# Patient Record
Sex: FEMALE | Race: WHITE | NOT HISPANIC OR LATINO | Employment: OTHER
[De-identification: names, ages, dates, MRNs, and addresses within clinical notes are randomized per-mention and may not be internally consistent; named-entity substitution may affect disease eponyms.]

---

## 2018-01-05 ENCOUNTER — HOSPITAL (OUTPATIENT)
Dept: OTHER | Age: 68
End: 2018-01-05
Attending: EMERGENCY MEDICINE

## 2019-12-14 ENCOUNTER — HOSPITAL (OUTPATIENT)
Dept: OTHER | Age: 69
End: 2019-12-14
Attending: EMERGENCY MEDICINE

## 2021-06-26 ENCOUNTER — WALK IN (OUTPATIENT)
Dept: URGENT CARE | Age: 71
End: 2021-06-26
Attending: EMERGENCY MEDICINE

## 2021-06-26 ENCOUNTER — HOSPITAL ENCOUNTER (OUTPATIENT)
Dept: GENERAL RADIOLOGY | Age: 71
Discharge: HOME | End: 2021-06-26
Attending: EMERGENCY MEDICINE

## 2021-06-26 VITALS
WEIGHT: 160 LBS | DIASTOLIC BLOOD PRESSURE: 53 MMHG | RESPIRATION RATE: 16 BRPM | TEMPERATURE: 97 F | HEART RATE: 60 BPM | SYSTOLIC BLOOD PRESSURE: 126 MMHG | OXYGEN SATURATION: 98 %

## 2021-06-26 DIAGNOSIS — S80.01XA CONTUSION OF RIGHT KNEE, INITIAL ENCOUNTER: Primary | ICD-10-CM

## 2021-06-26 DIAGNOSIS — T14.90XA TRAUMA: ICD-10-CM

## 2021-06-26 DIAGNOSIS — S00.33XA CONTUSION OF NOSE, INITIAL ENCOUNTER: ICD-10-CM

## 2021-06-26 PROCEDURE — 99212 OFFICE O/P EST SF 10 MIN: CPT

## 2021-06-26 PROCEDURE — 73564 X-RAY EXAM KNEE 4 OR MORE: CPT

## 2021-06-26 PROCEDURE — 70160 X-RAY EXAM OF NASAL BONES: CPT

## 2021-06-26 RX ORDER — AMLODIPINE BESYLATE 5 MG/1
TABLET ORAL
COMMUNITY
Start: 2021-05-15

## 2021-06-26 RX ORDER — BENAZEPRIL HYDROCHLORIDE 10 MG/1
TABLET ORAL
COMMUNITY
Start: 2021-05-15

## 2021-06-26 ASSESSMENT — ENCOUNTER SYMPTOMS
FEVER: 0
ABDOMINAL PAIN: 0
EYE REDNESS: 0
BACK PAIN: 0
DIZZINESS: 0
COUGH: 0
ADENOPATHY: 0

## 2021-06-26 ASSESSMENT — PAIN SCALES - GENERAL
PAINLEVEL: 3
PAINLEVEL_OUTOF10: 3

## 2023-02-09 ENCOUNTER — MED REC SCAN ONLY (OUTPATIENT)
Dept: INTERNAL MEDICINE CLINIC | Facility: CLINIC | Age: 73
End: 2023-02-09

## 2023-02-09 ENCOUNTER — LAB ENCOUNTER (OUTPATIENT)
Dept: LAB | Age: 73
End: 2023-02-09
Attending: INTERNAL MEDICINE
Payer: COMMERCIAL

## 2023-02-09 ENCOUNTER — OFFICE VISIT (OUTPATIENT)
Dept: INTERNAL MEDICINE CLINIC | Facility: CLINIC | Age: 73
End: 2023-02-09

## 2023-02-09 VITALS
WEIGHT: 171 LBS | HEART RATE: 60 BPM | DIASTOLIC BLOOD PRESSURE: 80 MMHG | OXYGEN SATURATION: 95 % | SYSTOLIC BLOOD PRESSURE: 122 MMHG | HEIGHT: 61.1 IN | RESPIRATION RATE: 18 BRPM | BODY MASS INDEX: 32.28 KG/M2

## 2023-02-09 DIAGNOSIS — H00.019 HORDEOLUM EXTERNUM, UNSPECIFIED LATERALITY: ICD-10-CM

## 2023-02-09 DIAGNOSIS — I10 PRIMARY HYPERTENSION: Primary | ICD-10-CM

## 2023-02-09 DIAGNOSIS — I10 PRIMARY HYPERTENSION: ICD-10-CM

## 2023-02-09 DIAGNOSIS — M21.611 BILATERAL BUNIONS: ICD-10-CM

## 2023-02-09 DIAGNOSIS — M20.41 HAMMERTOES OF BOTH FEET: ICD-10-CM

## 2023-02-09 DIAGNOSIS — M20.42 HAMMERTOES OF BOTH FEET: ICD-10-CM

## 2023-02-09 DIAGNOSIS — M21.612 BILATERAL BUNIONS: ICD-10-CM

## 2023-02-09 LAB
ALBUMIN SERPL-MCNC: 3.9 G/DL (ref 3.4–5)
ALBUMIN/GLOB SERPL: 1 {RATIO} (ref 1–2)
ALP LIVER SERPL-CCNC: 74 U/L
ALT SERPL-CCNC: 45 U/L
ANION GAP SERPL CALC-SCNC: 8 MMOL/L (ref 0–18)
AST SERPL-CCNC: 31 U/L (ref 15–37)
BILIRUB SERPL-MCNC: 0.5 MG/DL (ref 0.1–2)
BUN BLD-MCNC: 10 MG/DL (ref 7–18)
BUN/CREAT SERPL: 18.9 (ref 10–20)
CALCIUM BLD-MCNC: 10.5 MG/DL (ref 8.5–10.1)
CHLORIDE SERPL-SCNC: 103 MMOL/L (ref 98–112)
CHOLEST SERPL-MCNC: 199 MG/DL (ref ?–200)
CO2 SERPL-SCNC: 28 MMOL/L (ref 21–32)
CREAT BLD-MCNC: 0.53 MG/DL
DEPRECATED RDW RBC AUTO: 45.6 FL (ref 35.1–46.3)
ERYTHROCYTE [DISTWIDTH] IN BLOOD BY AUTOMATED COUNT: 13.3 % (ref 11–15)
FASTING PATIENT LIPID ANSWER: YES
FASTING STATUS PATIENT QL REPORTED: YES
GFR SERPLBLD BASED ON 1.73 SQ M-ARVRAT: 99 ML/MIN/1.73M2 (ref 60–?)
GLOBULIN PLAS-MCNC: 3.8 G/DL (ref 2.8–4.4)
GLUCOSE BLD-MCNC: 97 MG/DL (ref 70–99)
HCT VFR BLD AUTO: 44.2 %
HDLC SERPL-MCNC: 67 MG/DL (ref 40–59)
HGB BLD-MCNC: 14.7 G/DL
LDLC SERPL CALC-MCNC: 121 MG/DL (ref ?–100)
MCH RBC QN AUTO: 30.6 PG (ref 26–34)
MCHC RBC AUTO-ENTMCNC: 33.3 G/DL (ref 31–37)
MCV RBC AUTO: 92.1 FL
NONHDLC SERPL-MCNC: 132 MG/DL (ref ?–130)
OSMOLALITY SERPL CALC.SUM OF ELEC: 287 MOSM/KG (ref 275–295)
PLATELET # BLD AUTO: 287 10(3)UL (ref 150–450)
POTASSIUM SERPL-SCNC: 4 MMOL/L (ref 3.5–5.1)
PROT SERPL-MCNC: 7.7 G/DL (ref 6.4–8.2)
RBC # BLD AUTO: 4.8 X10(6)UL
SODIUM SERPL-SCNC: 139 MMOL/L (ref 136–145)
TRIGL SERPL-MCNC: 58 MG/DL (ref 30–149)
TSI SER-ACNC: 1.17 MIU/ML (ref 0.36–3.74)
VLDLC SERPL CALC-MCNC: 10 MG/DL (ref 0–30)
WBC # BLD AUTO: 5.8 X10(3) UL (ref 4–11)

## 2023-02-09 PROCEDURE — 80061 LIPID PANEL: CPT

## 2023-02-09 PROCEDURE — 85027 COMPLETE CBC AUTOMATED: CPT

## 2023-02-09 PROCEDURE — 99204 OFFICE O/P NEW MOD 45 MIN: CPT | Performed by: INTERNAL MEDICINE

## 2023-02-09 PROCEDURE — 84443 ASSAY THYROID STIM HORMONE: CPT

## 2023-02-09 PROCEDURE — 80053 COMPREHEN METABOLIC PANEL: CPT

## 2023-02-09 PROCEDURE — 36415 COLL VENOUS BLD VENIPUNCTURE: CPT

## 2023-02-09 RX ORDER — AMLODIPINE BESYLATE 5 MG/1
TABLET ORAL
COMMUNITY
Start: 2022-11-14 | End: 2023-02-09

## 2023-02-09 RX ORDER — AMLODIPINE BESYLATE 5 MG/1
5 TABLET ORAL DAILY
Qty: 90 TABLET | Refills: 1 | Status: SHIPPED | OUTPATIENT
Start: 2023-02-09

## 2023-02-09 RX ORDER — BENAZEPRIL HYDROCHLORIDE 10 MG/1
10 TABLET ORAL DAILY
Qty: 90 TABLET | Refills: 1 | Status: SHIPPED | OUTPATIENT
Start: 2023-02-09

## 2023-02-09 RX ORDER — BENAZEPRIL HYDROCHLORIDE 10 MG/1
TABLET ORAL
COMMUNITY
Start: 2022-11-14 | End: 2023-02-09

## 2023-02-09 RX ORDER — SULFACETAMIDE SODIUM 100 MG/ML
1 SOLUTION/ DROPS OPHTHALMIC
Qty: 1 EACH | Refills: 0 | Status: SHIPPED | OUTPATIENT
Start: 2023-02-09

## 2023-02-09 RX ORDER — SULFACETAMIDE SODIUM 100 MG/ML
1 SOLUTION/ DROPS OPHTHALMIC
COMMUNITY
End: 2023-02-09

## 2023-02-11 DIAGNOSIS — E83.52 HYPERCALCEMIA: Primary | ICD-10-CM

## 2023-02-22 ENCOUNTER — PATIENT MESSAGE (OUTPATIENT)
Dept: INTERNAL MEDICINE CLINIC | Facility: CLINIC | Age: 73
End: 2023-02-22

## 2023-02-22 NOTE — TELEPHONE ENCOUNTER
Katey Baca RN 2/22/2023 3:10 PM CST        ----- Message -----  From: Midkiff Kehernan  Sent: 2/22/2023 1:43 PM CST  To: Em Rn Triage  Subject: labs     Dr Wyatt Blair,   I just reviewed my labs and have a question? I realized I am taking Calcium with vitamin D 2 times a day contains D3 20mcg 800IU, a multivitamin contains D3 25mcg 1,000IU and also D3 25mcg 1000IU. I was wondering if I should stop taking these before I have the additional lab work you ordered. If so how long beforehand.    Thank you Isabel Hudson

## 2023-02-23 NOTE — TELEPHONE ENCOUNTER
As long as she is being consistent in taking this daily then it should be okay to continue   and ok to get blood work done

## 2023-02-25 ENCOUNTER — LAB ENCOUNTER (OUTPATIENT)
Dept: LAB | Age: 73
End: 2023-02-25
Attending: INTERNAL MEDICINE
Payer: MEDICARE

## 2023-02-25 DIAGNOSIS — E83.52 HYPERCALCEMIA: ICD-10-CM

## 2023-02-25 LAB
ANION GAP SERPL CALC-SCNC: 7 MMOL/L (ref 0–18)
BUN BLD-MCNC: 14 MG/DL (ref 7–18)
CALCIUM BLD-MCNC: 10.1 MG/DL (ref 8.5–10.1)
CALCIUM BLD-MCNC: 9.8 MG/DL (ref 8.5–10.1)
CHLORIDE SERPL-SCNC: 103 MMOL/L (ref 98–112)
CO2 SERPL-SCNC: 26 MMOL/L (ref 21–32)
CREAT BLD-MCNC: 0.47 MG/DL
CREAT BLD-MCNC: 0.5 MG/DL
FASTING STATUS PATIENT QL REPORTED: YES
GFR SERPLBLD BASED ON 1.73 SQ M-ARVRAT: 100 ML/MIN/1.73M2 (ref 60–?)
GLUCOSE BLD-MCNC: 83 MG/DL (ref 70–99)
OSMOLALITY SERPL CALC.SUM OF ELEC: 282 MOSM/KG (ref 275–295)
PHOSPHATE SERPL-MCNC: 3.3 MG/DL (ref 2.5–4.9)
PHOSPHATE SERPL-MCNC: 3.6 MG/DL (ref 2.5–4.9)
POTASSIUM SERPL-SCNC: 4.2 MMOL/L (ref 3.5–5.1)
PTH-INTACT SERPL-MCNC: 50.3 PG/ML (ref 18.5–88)
SODIUM SERPL-SCNC: 136 MMOL/L (ref 136–145)
VIT D+METAB SERPL-MCNC: 37.8 NG/ML (ref 30–100)

## 2023-02-25 PROCEDURE — 82310 ASSAY OF CALCIUM: CPT

## 2023-02-25 PROCEDURE — 84100 ASSAY OF PHOSPHORUS: CPT

## 2023-02-25 PROCEDURE — 36415 COLL VENOUS BLD VENIPUNCTURE: CPT

## 2023-02-25 PROCEDURE — 82306 VITAMIN D 25 HYDROXY: CPT

## 2023-02-25 PROCEDURE — 83970 ASSAY OF PARATHORMONE: CPT

## 2023-02-25 PROCEDURE — 80048 BASIC METABOLIC PNL TOTAL CA: CPT

## 2023-02-25 PROCEDURE — 82565 ASSAY OF CREATININE: CPT

## 2023-03-13 ENCOUNTER — OFFICE VISIT (OUTPATIENT)
Dept: INTERNAL MEDICINE CLINIC | Facility: CLINIC | Age: 73
End: 2023-03-13

## 2023-03-13 VITALS
WEIGHT: 170.38 LBS | OXYGEN SATURATION: 98 % | HEIGHT: 61.1 IN | HEART RATE: 56 BPM | SYSTOLIC BLOOD PRESSURE: 116 MMHG | RESPIRATION RATE: 16 BRPM | BODY MASS INDEX: 32.17 KG/M2 | DIASTOLIC BLOOD PRESSURE: 82 MMHG

## 2023-03-13 DIAGNOSIS — I10 PRIMARY HYPERTENSION: ICD-10-CM

## 2023-03-13 DIAGNOSIS — Z00.00 ENCOUNTER FOR ANNUAL HEALTH EXAMINATION: Primary | ICD-10-CM

## 2023-03-13 DIAGNOSIS — Z12.83 SKIN CANCER SCREENING: ICD-10-CM

## 2023-03-13 RX ORDER — AMLODIPINE BESYLATE 5 MG/1
5 TABLET ORAL DAILY
Qty: 90 TABLET | Refills: 3 | Status: SHIPPED | OUTPATIENT
Start: 2023-03-13

## 2023-03-13 RX ORDER — BENAZEPRIL HYDROCHLORIDE 10 MG/1
10 TABLET ORAL DAILY
Qty: 90 TABLET | Refills: 3 | Status: SHIPPED | OUTPATIENT
Start: 2023-03-13

## 2023-07-17 ENCOUNTER — WALK IN (OUTPATIENT)
Dept: URGENT CARE | Age: 73
End: 2023-07-17
Attending: EMERGENCY MEDICINE

## 2023-07-17 VITALS
WEIGHT: 165 LBS | SYSTOLIC BLOOD PRESSURE: 144 MMHG | TEMPERATURE: 98.3 F | DIASTOLIC BLOOD PRESSURE: 77 MMHG | HEART RATE: 57 BPM | RESPIRATION RATE: 16 BRPM | OXYGEN SATURATION: 97 %

## 2023-07-17 DIAGNOSIS — H00.011 HORDEOLUM EXTERNUM OF RIGHT UPPER EYELID: Primary | ICD-10-CM

## 2023-07-17 PROCEDURE — 99212 OFFICE O/P EST SF 10 MIN: CPT

## 2023-07-17 RX ORDER — ERYTHROMYCIN 5 MG/G
0.5 OINTMENT OPHTHALMIC 4 TIMES DAILY
Qty: 3.5 G | Refills: 0 | Status: SHIPPED | OUTPATIENT
Start: 2023-07-17 | End: 2023-07-24

## 2023-07-17 RX ORDER — AMOXICILLIN AND CLAVULANATE POTASSIUM 875; 125 MG/1; MG/1
1 TABLET, FILM COATED ORAL 2 TIMES DAILY
Qty: 10 TABLET | Refills: 0 | Status: SHIPPED | OUTPATIENT
Start: 2023-07-17 | End: 2023-07-22

## 2023-07-17 ASSESSMENT — ENCOUNTER SYMPTOMS
VOMITING: 0
SHORTNESS OF BREATH: 0
BRUISES/BLEEDS EASILY: 0
BACK PAIN: 0
SORE THROAT: 0
ABDOMINAL PAIN: 0
DIZZINESS: 0
FEVER: 0
DIARRHEA: 0
COUGH: 0
HEADACHES: 0
CHILLS: 0

## 2023-07-17 ASSESSMENT — PAIN SCALES - GENERAL
PAINLEVEL: 0
PAINLEVEL_OUTOF10: 0

## 2023-08-02 DIAGNOSIS — I10 PRIMARY HYPERTENSION: ICD-10-CM

## 2023-08-03 RX ORDER — AMLODIPINE BESYLATE 5 MG/1
5 TABLET ORAL DAILY
Qty: 90 TABLET | Refills: 3 | OUTPATIENT
Start: 2023-08-03

## 2023-08-03 RX ORDER — BENAZEPRIL HYDROCHLORIDE 10 MG/1
10 TABLET ORAL DAILY
Qty: 90 TABLET | Refills: 3 | OUTPATIENT
Start: 2023-08-03

## 2024-02-06 ENCOUNTER — HOSPITAL ENCOUNTER (OUTPATIENT)
Dept: MAMMOGRAPHY | Age: 74
Discharge: HOME | End: 2024-02-06
Attending: INTERNAL MEDICINE
Payer: MEDICARE

## 2024-02-06 DIAGNOSIS — Z12.31 ENCOUNTER FOR SCREENING MAMMOGRAM FOR MALIGNANT NEOPLASM OF BREAST: ICD-10-CM

## 2024-02-06 PROCEDURE — 77067 SCR MAMMO BI INCL CAD: CPT | Performed by: INTERNAL MEDICINE

## 2024-02-06 PROCEDURE — 77063 BREAST TOMOSYNTHESIS BI: CPT | Performed by: INTERNAL MEDICINE

## 2024-03-22 ENCOUNTER — HOSPITAL ENCOUNTER (OUTPATIENT)
Dept: MAMMOGRAPHY | Facility: HOSPITAL | Age: 74
Discharge: HOME | End: 2024-03-22
Attending: INTERNAL MEDICINE
Payer: MEDICARE

## 2024-03-22 DIAGNOSIS — R92.2 INCONCLUSIVE MAMMOGRAM: ICD-10-CM

## 2024-03-22 PROCEDURE — 76642 ULTRASOUND BREAST LIMITED: CPT | Performed by: INTERNAL MEDICINE

## 2024-03-22 PROCEDURE — 77061 BREAST TOMOSYNTHESIS UNI: CPT | Performed by: INTERNAL MEDICINE

## 2024-03-22 PROCEDURE — 77065 DX MAMMO INCL CAD UNI: CPT | Performed by: INTERNAL MEDICINE

## 2024-04-02 ENCOUNTER — OFFICE VISIT (OUTPATIENT)
Dept: INTERNAL MEDICINE CLINIC | Facility: CLINIC | Age: 74
End: 2024-04-02
Payer: MEDICARE

## 2024-04-02 VITALS
HEIGHT: 61.1 IN | BODY MASS INDEX: 34.4 KG/M2 | WEIGHT: 182.19 LBS | HEART RATE: 77 BPM | RESPIRATION RATE: 18 BRPM | SYSTOLIC BLOOD PRESSURE: 138 MMHG | DIASTOLIC BLOOD PRESSURE: 76 MMHG

## 2024-04-02 DIAGNOSIS — N39.46 MIXED STRESS AND URGE URINARY INCONTINENCE: ICD-10-CM

## 2024-04-02 DIAGNOSIS — Z91.89 AT RISK FOR DECREASED BONE DENSITY: ICD-10-CM

## 2024-04-02 DIAGNOSIS — Z78.0 ASYMPTOMATIC MENOPAUSAL STATE: ICD-10-CM

## 2024-04-02 DIAGNOSIS — Z00.00 ENCOUNTER FOR ANNUAL HEALTH EXAMINATION: Primary | ICD-10-CM

## 2024-04-02 DIAGNOSIS — M20.42 HAMMER TOE OF LEFT FOOT: ICD-10-CM

## 2024-04-02 DIAGNOSIS — E55.9 VITAMIN D DEFICIENCY: ICD-10-CM

## 2024-04-02 DIAGNOSIS — H00.019 HORDEOLUM EXTERNUM, UNSPECIFIED LATERALITY: ICD-10-CM

## 2024-04-02 DIAGNOSIS — I10 PRIMARY HYPERTENSION: ICD-10-CM

## 2024-04-02 DIAGNOSIS — Z11.59 NEED FOR HEPATITIS C SCREENING TEST: ICD-10-CM

## 2024-04-02 PROCEDURE — G0439 PPPS, SUBSEQ VISIT: HCPCS | Performed by: INTERNAL MEDICINE

## 2024-04-02 RX ORDER — AMLODIPINE BESYLATE 5 MG/1
5 TABLET ORAL DAILY
Qty: 90 TABLET | Refills: 3 | Status: SHIPPED | OUTPATIENT
Start: 2024-04-02

## 2024-04-02 RX ORDER — SULFACETAMIDE SODIUM 100 MG/ML
1 SOLUTION/ DROPS OPHTHALMIC
Qty: 1 EACH | Refills: 0 | Status: CANCELLED | OUTPATIENT
Start: 2024-04-02

## 2024-04-02 RX ORDER — BENAZEPRIL HYDROCHLORIDE 10 MG/1
10 TABLET ORAL DAILY
Qty: 90 TABLET | Refills: 3 | Status: SHIPPED | OUTPATIENT
Start: 2024-04-02

## 2024-04-02 NOTE — PROGRESS NOTES
Subjective:   Sudha Patrick is a 73 year old female who presents for a Medicare Subsequent Annual Wellness visit (Pt already had Initial Annual Wellness) and scheduled follow up of multiple significant but stable problems.   PT COMES FOR HER MEDICARE PHYSICAL   OVERALL DOING WELL             HISTORY  New pt   C/c establish care   C/o seen last her pcp 2/11/2022     Retired in 2017 and moved here then   Used to be seen in [facility] in [place]      Retired nurse -med surg , lives alone      PMH  HTN  Styes   Hammertoes and bunions   --------------------------------------------------------------------------------------------------          History/Other:   Fall Risk Assessment:   She has been screened for Falls and is low risk.      Cognitive Assessment:   She had a completely normal cognitive assessment - see flowsheet entries       Functional Ability/Status:   Sudha Patrick has a completely normal functional assessment. See flowsheet for details.        Depression Screening (PHQ-2/PHQ-9): PHQ-2 SCORE: 0  , done 4/2/2024   If you checked off any problems, how difficult have these problems made it for you to do your work, take care of things at home, or get along with other people?: Not difficult at all    Last [facility] Suicide Screening on 4/2/2024 was No Risk.          Advanced Directives:   She does have a Living Will but we do NOT have it on file in Epic.    She does have a POA but we do NOT have it on file in Epic.    Discussed Advance Care Planning with patient (and family/surrogate if present). Standard forms made available to patient in After Visit Summary.      Patient Active Problem List   Diagnosis    Primary hypertension     Allergies:  She has No Known Allergies.    Current Medications:  Outpatient Medications Marked as Taking for the 4/2/24 encounter (Office Visit) with Princess Gregorio MD   Medication Sig    amLODIPine 5 MG Oral Tab Take 1 tablet (5 mg total) by mouth daily.     Benazepril HCl 10 MG Oral Tab Take 1 tablet (10 mg total) by mouth daily.    sulfacetamide 10 % Ophthalmic Solution Place 1 drop into both eyes every 3 (three) hours.       Medical History:  She  has a past medical history of Allergic rhinitis, Essential hypertension, and Obesity.  Surgical History:  She  has a past surgical history that includes other surgical history; needle biopsy left (); ; and tubal ligation.   Family History:  Her family history includes Cancer in her sister; Depression in her sister; Diabetes in her father; Heart Disorder in her father; Hypertension in her father and mother; Lipids in her father.  Social History:  She  reports that she has never smoked. She has never used smokeless tobacco. She reports that she does not currently use alcohol. She reports that she does not use drugs.    Tobacco:  She has never smoked tobacco.    CAGE Alcohol Screen:   CAGE screening score of 0 on 2024, showing low risk of alcohol abuse.      Patient Care Team:  Princess Gregorio MD as PCP - General (Internal Medicine)    Review of Systems  GENERAL: feels well otherwise  SKIN: denies any unusual skin lesions  EYES: denies blurred vision or double vision  HEENT: denies nasal congestion, sinus pain or ST  LUNGS: denies shortness of breath with exertion  CARDIOVASCULAR: denies chest pain on exertion  GI: denies abdominal pain, denies heartburn  : denies dysuria, vaginal discharge or itching, +  complaint of urinary incontinence   MUSCULOSKELETAL: denies back pain  NEURO: denies headaches  PSYCHE: denies depression or anxiety  HEMATOLOGIC: denies hx of anemia  ENDOCRINE: denies thyroid history  ALL/ASTHMA: denies hx of allergy or asthma    Objective:   Physical Exam  GENERAL: well developed, well nourished,in no apparent distress  SKIN: no rashes,no suspicious lesions  HEENT: atraumatic, normocephalic,ears and throat are clear, no frontal or maxillary sinus tenderness, pupils equal reactive to light  bilaterally, extra ocular muscles intact  NECK: supple,no adenopathy,nontender   LUNGS: clear to auscultation, no wheeze  CARDIO: RRR without murmur  GI: good BS's,no masses or tenderness  EXTREMITIES: no cyanosis, or edema  Declined breast exam       /76 (BP Location: Right arm, Patient Position: Sitting, Cuff Size: adult)   Pulse 77   Resp 18   Ht 5' 1.1\" (1.552 m)   Wt 182 lb 3.2 oz (82.6 kg)   LMP  (LMP Unknown)   BMI 34.31 kg/m²  Estimated body mass index is 34.31 kg/m² as calculated from the following:    Height as of this encounter: 5' 1.1\" (1.552 m).    Weight as of this encounter: 182 lb 3.2 oz (82.6 kg).    Medicare Hearing Assessment:   Hearing Screening    Time taken: 4/2/2024 11:40 AM  Screening Method: Questionnaire  I have a problem hearing over the telephone: No I have trouble following the conversations when two or more people are talking at the same time: No   I have trouble understanding things on the TV: No I have to strain to understand conversations: No   I have to worry about missing the telephone ring or doorbell: No I have trouble hearing conversations in a noisy background such as a crowded room or restaurant: No   I get confused about where sounds come from: No I misunderstand some words in a sentence and need to ask people to repeat themselves: No   I especially have trouble understanding the speech of women and children: No I have trouble understanding the speaker in a large room such as at a meeting or place of [place]: No   Many people I talk to seem to mumble (or don't speak clearly): No People get annoyed because I misunderstand what they say: No   I misunderstand what others are saying and make inappropriate responses: No I avoid social activities because I cannot hear well and fear I will reply improperly: No   Family members and friends have told me they think I may have hearing loss: No                   Assessment & Plan:   Sudha Patrick is a 73 year old female  who presents for a Medicare Assessment.     1. Encounter for annual health examination (Primary)  -     Comp Metabolic Panel (14); Future; Expected date: 04/02/2024  -     Lipid Panel; Future; Expected date: 04/02/2024  -     Assay, Thyroid Stim Hormone; Future; Expected date: 04/02/2024  -     Vitamin D; Future; Expected date: 04/02/2024  -     CBC, Platelet; No Differential; Future; Expected date: 04/02/2024    2. Primary hypertension  -     amLODIPine Besylate; Take 1 tablet (5 mg total) by mouth daily.  Dispense: 90 tablet; Refill: 3  -     Benazepril HCl; Take 1 tablet (10 mg total) by mouth daily.  Dispense: 90 tablet; Refill: 3  -     Comp Metabolic Panel (14); Future; Expected date: 04/02/2024  -     Lipid Panel; Future; Expected date: 04/02/2024  -     Assay, Thyroid Stim Hormone; Future; Expected date: 04/02/2024  -     Vitamin D; Future; Expected date: 04/02/2024  -     CBC, Platelet; No Differential; Future; Expected date: 04/02/2024  Advised pt to follow a low salt , low sodium (including fast foods and processed foods), can look up DASH diet, exercise 30 min a day , monitor bp   Cont meds   3. Hordeolum externum, unspecified laterality    4. Need for hepatitis C screening test  -     HCV Antibody; Future; Expected date: 04/02/2024 stable  Ordered  5. Mixed stress and urge urinary incontinence  -     PHYSICAL THERAPY - INTERNAL  Will send for physical therapy for pelvic floor therapy  6. Vitamin D deficiency  -     Vitamin D; Future; Expected date: 04/02/2024  Recheck   7. At risk for decreased bone density  -     XR DEXA BONE DENSITOMETRY (CPT=77080); Future; Expected date: 04/02/2024  Bone density scan   8. Asymptomatic menopausal state  -     XR DEXA BONE DENSITOMETRY (CPT=77080); Future; Expected date: 04/02/2024  bone density scan ordered  9. Hammer toe of left foot  -     Podiatry Referral - In Network  Will refer             preventative medicine   Labs -2/2023   Pap- 2018 , defer further    Mammogram - 3/2024  dexa 3/11/2021,reordered   cscope - 3/12/2021 rpt 5 yrs IE 2026      The patient indicates understanding of these issues and agrees to the plan.  Reinforced healthy diet, lifestyle, and exercise.      Return in 1 year (on 4/2/2025).     Princess Gregorio MD, 4/2/2024     Supplementary Documentation:   General Health:  In the past six months, have you lost more than 10 pounds without trying?: 2 - No  Has your appetite been poor?: No  Type of Diet: Balanced  How does the patient maintain a good energy level?: Daily Walks;Stretching  How would you describe your daily physical activity?: Light  How would you describe your current health state?: Good  How do you maintain positive mental well-being?: Games;Visiting Friends;Visiting Family  On a scale of 0 to 10, with 0 being no pain and 10 being severe pain, what is your pain level?: 0 - (None)  At any time do you feel concerned for the safety/well-being of yourself and/or your children, in your home or elsewhere?: No  Have you had any immunizations at another office such as Influenza, Hepatitis B, Tetanus, or Pneumococcal?: Yes         Sudha Patrick's SCREENING SCHEDULE   Tests on this list are recommended by your physician but may not be covered, or covered at this frequency, by your insurer.   Please check with your insurance carrier before scheduling to verify coverage.   PREVENTATIVE SERVICES FREQUENCY &  COVERAGE DETAILS LAST COMPLETION DATE   Diabetes Screening    Fasting Blood Sugar /  Glucose    One screening every 12 months if never tested or if previously tested but not diagnosed with pre-diabetes   One screening every 6 months if diagnosed with pre-diabetes Lab Results   Component Value Date    GLU 83 02/25/2023        Cardiovascular Disease Screening    Lipid Panel  Cholesterol  Lipoprotein (HDL)  Triglycerides Covered every 5 years for all Medicare beneficiaries without apparent signs or symptoms of cardiovascular disease Lab Results    Component Value Date    CHOLEST 199 02/09/2023    HDL 67 (H) 02/09/2023     (H) 02/09/2023    TRIG 58 02/09/2023         Electrocardiogram (EKG)   Covered if needed at Welcome to Medicare, and non-screening if indicated for medical reasons -      Ultrasound Screening for Abdominal Aortic Aneurysm (AAA) Covered once in a lifetime for one of the following risk factors    Men who are 65-75 years old and have ever smoked    Anyone with a family history -     Colorectal Cancer Screening  Covered for ages 50-85; only need ONE of the following:    Colonoscopy   Covered every 10 years    Covered every 2 years if patient is at high risk or previous colonoscopy was abnormal 03/12/2021    Health Maintenance   Topic Date Due    Colorectal Cancer Screening  03/12/2026       Flexible Sigmoidoscopy   Covered every 4 years -    Fecal Occult Blood Test Covered annually -   Bone Density Screening    Bone density screening    Covered every 2 years after age 65 if diagnosed with risk of osteoporosis or estrogen deficiency.    Covered yearly for long-term glucocorticoid medication use (Steroids) No results found for this or any previous visit.      No recommendations at this time   Pap and Pelvic    Pap   Covered every 2 years for women at normal risk; Annually if at high risk -  No recommendations at this time    Chlamydia Annually if high risk -  No recommendations at this time   Screening Mammogram    Mammogram     Recommend annually for all female patients aged 40 and older    One baseline mammogram covered for patients aged 35-39 03/22/2024    Health Maintenance   Topic Date Due    Mammogram  03/22/2025       Immunizations    Influenza Covered once per flu season  Please get every year 10/16/2023  No recommendations at this time    Pneumococcal Each vaccine ([id number] & [id number]) covered once after 65 Prevnar 13: 05/22/2017    [id number]: 02/11/2022     No recommendations at this time    Hepatitis B One screening  covered for patients with certain risk factors   -  No recommendations at this time    Tetanus Toxoid Not covered by Medicare Part B unless medically necessary (cut with metal); may be covered with your pharmacy prescription benefits -    Tetanus, Diptheria and Pertusis TD and TDaP Not covered by Medicare Part B -  No recommendations at this time    Zoster Not covered by Medicare Part B; may be covered with your pharmacy  prescription benefits -  No recommendations at this time     Annual Monitoring of Persistent Medications (ACE/ARB, digoxin diuretics, anticonvulsants)    Potassium Annually Lab Results   Component Value Date    K 4.2 02/25/2023         Creatinine   Annually Lab Results   Component Value Date    CREATSERUM 0.47 (L) 02/25/2023    CREATSERUM 0.50 (L) 02/25/2023         BUN Annually Lab Results   Component Value Date    BUN 14 02/25/2023       Drug Serum Conc Annually No results found for: \"DIGOXIN\", \"DIG\", \"VALP\"

## 2024-04-02 NOTE — PATIENT INSTRUCTIONS
Sudha Patrick's SCREENING SCHEDULE   Tests on this list are recommended by your physician but may not be covered, or covered at this frequency, by your insurer.   Please check with your insurance carrier before scheduling to verify coverage.   PREVENTATIVE SERVICES FREQUENCY &  COVERAGE DETAILS LAST COMPLETION DATE   Diabetes Screening    Fasting Blood Sugar /  Glucose    One screening every 12 months if never tested or if previously tested but not diagnosed with pre-diabetes   One screening every 6 months if diagnosed with pre-diabetes Lab Results   Component Value Date    GLU 83 02/25/2023        Cardiovascular Disease Screening    Lipid Panel  Cholesterol  Lipoprotein (HDL)  Triglycerides Covered every 5 years for all Medicare beneficiaries without apparent signs or symptoms of cardiovascular disease Lab Results   Component Value Date    CHOLEST 199 02/09/2023    HDL 67 (H) 02/09/2023     (H) 02/09/2023    TRIG 58 02/09/2023         Electrocardiogram (EKG)   Covered if needed at Welcome to Medicare, and non-screening if indicated for medical reasons -      Ultrasound Screening for Abdominal Aortic Aneurysm (AAA) Covered once in a lifetime for one of the following risk factors   • Men who are 65-75 years old and have ever smoked   • Anyone with a family history -     Colorectal Cancer Screening  Covered for ages 50-85; only need ONE of the following:    Colonoscopy   Covered every 10 years    Covered every 2 years if patient is at high risk or previous colonoscopy was abnormal 03/12/2021    Health Maintenance   Topic Date Due   • Colorectal Cancer Screening  03/12/2026       Flexible Sigmoidoscopy   Covered every 4 years -    Fecal Occult Blood Test Covered annually -   Bone Density Screening    Bone density screening    Covered every 2 years after age 65 if diagnosed with risk of osteoporosis or estrogen deficiency.    Covered yearly for long-term glucocorticoid medication use (Steroids) No results  found for this or any previous visit.      No recommendations at this time   Pap and Pelvic    Pap   Covered every 2 years for women at normal risk; Annually if at high risk -  No recommendations at this time    Chlamydia Annually if high risk -  No recommendations at this time   Screening Mammogram    Mammogram     Recommend annually for all female patients aged 40 and older    One baseline mammogram covered for patients aged 35-39 03/22/2024    Health Maintenance   Topic Date Due   • Mammogram  03/22/2025       Immunizations    Influenza Covered once per flu season  Please get every year 10/16/2023  No recommendations at this time    Pneumococcal Each vaccine ([id number] & [id number]) covered once after 65 Prevnar 13: 05/22/2017    Hhkfverqn58: 02/11/2022     No recommendations at this time    Hepatitis B One screening covered for patients with certain risk factors   -  No recommendations at this time    Tetanus Toxoid Not covered by Medicare Part B unless medically necessary (cut with metal); may be covered with your pharmacy prescription benefits -    Tetanus, Diptheria and Pertusis TD and TDaP Not covered by Medicare Part B -  No recommendations at this time    Zoster Not covered by Medicare Part B; may be covered with your pharmacy  prescription benefits -  No recommendations at this time     Annual Monitoring of Persistent Medications (ACE/ARB, digoxin diuretics, anticonvulsants)    Potassium Annually Lab Results   Component Value Date    K 4.2 02/25/2023         Creatinine   Annually Lab Results   Component Value Date    CREATSERUM 0.47 (L) 02/25/2023    CREATSERUM 0.50 (L) 02/25/2023         BUN Annually Lab Results   Component Value Date    BUN 14 02/25/2023       Drug Serum Conc Annually No results found for: \"DIGOXIN\", \"DIG\", \"VALP\"

## 2024-05-14 ENCOUNTER — PATIENT MESSAGE (OUTPATIENT)
Dept: INTERNAL MEDICINE CLINIC | Facility: CLINIC | Age: 74
End: 2024-05-14

## 2024-05-14 NOTE — TELEPHONE ENCOUNTER
From: Sudha Patrick  To: Princess Gregorio  Sent: 5/14/2024 10:41 AM CDT  Subject: Hepatitis C screening    Good Morning   I was reviewing lab orders that I have ordered and wondering why Hepatitis C was ordered. I have had that done previously. Would you like me to get those results for you to review?  Thank you

## 2024-05-15 ENCOUNTER — HOSPITAL ENCOUNTER (OUTPATIENT)
Dept: BONE DENSITY | Age: 74
Discharge: HOME | End: 2024-05-15
Attending: INTERNAL MEDICINE

## 2024-05-15 DIAGNOSIS — Z78.0 ASYMPTOMATIC MENOPAUSAL STATE: ICD-10-CM

## 2024-05-15 DIAGNOSIS — Z91.89 AT RISK FOR DECREASED BONE DENSITY: ICD-10-CM

## 2024-05-15 PROCEDURE — 77080 DXA BONE DENSITY AXIAL: CPT | Performed by: INTERNAL MEDICINE

## 2024-05-15 NOTE — TELEPHONE ENCOUNTER
Patient can choose to not get that test done, when she goes to the lab for the rest of her blood work just ask not to do the Hepatitis C test. We need a copy of her records to confirm she tested negative. Thank you

## 2024-05-15 NOTE — TELEPHONE ENCOUNTER
Patient called indicated that she had the hepatitis C screening done on 2021 in [place]. Wanted to know if doctor could remove the order?

## 2024-05-16 ENCOUNTER — LAB ENCOUNTER (OUTPATIENT)
Dept: LAB | Age: 74
End: 2024-05-16
Attending: INTERNAL MEDICINE

## 2024-05-16 DIAGNOSIS — Z11.59 NEED FOR HEPATITIS C SCREENING TEST: ICD-10-CM

## 2024-05-16 DIAGNOSIS — Z00.00 ENCOUNTER FOR ANNUAL HEALTH EXAMINATION: ICD-10-CM

## 2024-05-16 DIAGNOSIS — I10 PRIMARY HYPERTENSION: ICD-10-CM

## 2024-05-16 DIAGNOSIS — E55.9 VITAMIN D DEFICIENCY: ICD-10-CM

## 2024-05-16 LAB
ALBUMIN SERPL-MCNC: 3.5 G/DL (ref 3.4–5)
ALBUMIN/GLOB SERPL: 1 {RATIO} (ref 1–2)
ALP LIVER SERPL-CCNC: 75 U/L
ALT SERPL-CCNC: 43 U/L
ANION GAP SERPL CALC-SCNC: 6 MMOL/L (ref 0–18)
AST SERPL-CCNC: 26 U/L (ref 15–37)
BILIRUB SERPL-MCNC: 0.5 MG/DL (ref 0.1–2)
BUN BLD-MCNC: 21 MG/DL (ref 9–23)
CALCIUM BLD-MCNC: 9.6 MG/DL (ref 8.5–10.1)
CHLORIDE SERPL-SCNC: 106 MMOL/L (ref 98–112)
CHOLEST SERPL-MCNC: 208 MG/DL (ref ?–200)
CO2 SERPL-SCNC: 26 MMOL/L (ref 21–32)
CREAT BLD-MCNC: 0.44 MG/DL
EGFRCR SERPLBLD CKD-EPI 2021: 102 ML/MIN/1.73M2 (ref 60–?)
ERYTHROCYTE [DISTWIDTH] IN BLOOD BY AUTOMATED COUNT: 13.5 %
FASTING PATIENT LIPID ANSWER: YES
FASTING STATUS PATIENT QL REPORTED: YES
GLOBULIN PLAS-MCNC: 3.6 G/DL (ref 2.8–4.4)
GLUCOSE BLD-MCNC: 78 MG/DL (ref 70–99)
HCT VFR BLD AUTO: 41.5 %
HDLC SERPL-MCNC: 65 MG/DL (ref 40–59)
HGB BLD-MCNC: 13.7 G/DL
LDLC SERPL CALC-MCNC: 132 MG/DL (ref ?–100)
MCH RBC QN AUTO: 30.5 PG (ref 26–34)
MCHC RBC AUTO-ENTMCNC: 33 G/DL (ref 31–37)
MCV RBC AUTO: 92.4 FL
NONHDLC SERPL-MCNC: 143 MG/DL (ref ?–130)
OSMOLALITY SERPL CALC.SUM OF ELEC: 288 MOSM/KG (ref 275–295)
PLATELET # BLD AUTO: 303 10(3)UL (ref 150–450)
POTASSIUM SERPL-SCNC: 4.1 MMOL/L (ref 3.5–5.1)
PROT SERPL-MCNC: 7.1 G/DL (ref 6.4–8.2)
RBC # BLD AUTO: 4.49 X10(6)UL
SODIUM SERPL-SCNC: 138 MMOL/L (ref 136–145)
TRIGL SERPL-MCNC: 60 MG/DL (ref 30–149)
TSI SER-ACNC: 1.03 MIU/ML (ref 0.36–3.74)
VIT D+METAB SERPL-MCNC: 53.6 NG/ML (ref 30–100)
VLDLC SERPL CALC-MCNC: 11 MG/DL (ref 0–30)
WBC # BLD AUTO: 6.8 X10(3) UL (ref 4–11)

## 2024-05-16 PROCEDURE — 82306 VITAMIN D 25 HYDROXY: CPT

## 2024-05-16 PROCEDURE — 80061 LIPID PANEL: CPT

## 2024-05-16 PROCEDURE — 84443 ASSAY THYROID STIM HORMONE: CPT

## 2024-05-16 PROCEDURE — 80053 COMPREHEN METABOLIC PANEL: CPT

## 2024-05-16 PROCEDURE — 85027 COMPLETE CBC AUTOMATED: CPT

## 2024-05-31 ENCOUNTER — APPOINTMENT (OUTPATIENT)
Dept: ULTRASOUND IMAGING | Age: 74
End: 2024-05-31
Attending: PHYSICIAN ASSISTANT
Payer: MEDICARE

## 2024-05-31 ENCOUNTER — HOSPITAL ENCOUNTER (OUTPATIENT)
Age: 74
Discharge: HOME | End: 2024-05-31
Payer: MEDICARE

## 2024-05-31 VITALS
SYSTOLIC BLOOD PRESSURE: 148 MMHG | OXYGEN SATURATION: 95 % | DIASTOLIC BLOOD PRESSURE: 59 MMHG | WEIGHT: 170 LBS | HEIGHT: 64 IN | BODY MASS INDEX: 29.02 KG/M2 | RESPIRATION RATE: 20 BRPM | TEMPERATURE: 99 F

## 2024-05-31 DIAGNOSIS — R60.0 LOWER EXTREMITY EDEMA: ICD-10-CM
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
## 2024-05-31 PROCEDURE — 99204 OFFICE O/P NEW MOD 45 MIN: CPT

## 2024-05-31 PROCEDURE — 99214 OFFICE O/P EST MOD 30 MIN: CPT

## 2024-05-31 PROCEDURE — 93971 EXTREMITY STUDY: CPT | Performed by: PHYSICIAN ASSISTANT

## 2024-05-31 RX ORDER — CETIRIZINE HYDROCHLORIDE 10 MG/1
10 TABLET ORAL DAILY
Qty: 30 TABLET | Refills: 0 | Status: SHIPPED | OUTPATIENT
Start: 2024-05-31 | End: 2024-06-30

## 2024-05-31 RX ORDER — CLOBETASOL PROPIONATE 0.5 MG/G
1 CREAM TOPICAL 2 TIMES DAILY
Qty: 30 G | Refills: 0 | Status: SHIPPED | OUTPATIENT
Start: 2024-05-31 | End: 2024-06-10

## 2024-05-31 RX ORDER — CEPHALEXIN 500 MG/1
500 CAPSULE ORAL 3 TIMES DAILY
Qty: 21 CAPSULE | Refills: 0 | Status: SHIPPED | OUTPATIENT
Start: 2024-05-31 | End: 2024-06-07

## 2024-05-31 NOTE — ED INITIAL ASSESSMENT (HPI)
Last Saturday pt was using a weedwhacker and wearing long pants. Then felt stinging to the left lower leg. C/o pain to touch, some redness and swelling. Took ibu this am.

## 2024-06-01 NOTE — DISCHARGE INSTRUCTIONS
Take antibiotics as directed    Use topical steroid cream to area    Elevate lower extremity

## 2024-06-01 NOTE — ED PROVIDER NOTES
Patient Seen in: [facility]      History     Chief Complaint   Patient presents with    Bite Sting,Insect     Stated Complaint: bug bite on left lower leg, causing swelling    Subjective:   HPI    74-year-old female who comes in today complaining of a bug bite that occurred while weed whacking 6 days ago on the left medial leg.  Patient states that notes become more swollen and tender to touch.  She states she felt like she was bit by something did not check what bit her.  She took ibuprofen this morning because of the pain and redness.  Patient denies fever or chills.  Patient has known history of hypertension.    Objective:   Past Medical History:    Allergic rhinitis    Essential hypertension    Obesity              Past Surgical History:   Procedure Laterality Date    Needle biopsy left      benign          Other surgical history      right hammertoes with screws and left hammer toe and bunnions, right knee torn meniscus    Tubal ligation                  Social History     Socioeconomic History    Marital status: Single   Tobacco Use    Smoking status: Never     Passive exposure: Never    Smokeless tobacco: Never   Vaping Use    Vaping status: Never Used   Substance and Sexual Activity    Alcohol use: Not Currently    Drug use: Never              Review of Systems    Positive for stated complaint: bug bite on left lower leg, causing swelling  Other systems are as noted in HPI.  Constitutional and vital signs reviewed.      All other systems reviewed and negative except as noted above.    Physical Exam     ED Triage Vitals [24 1730]   /59   Pulse    Resp 20   Temp 98.8 °F (37.1 °C)   Temp src Temporal   SpO2 95 %   O2 Device None (Room air)       Current Vitals:   Vital Signs  BP: 148/59  Resp: 20  Temp: 98.8 °F (37.1 °C)  Temp src: Temporal    Oxygen Therapy  SpO2: 95 %  O2 Device: None (Room air)            Physical Exam  Vitals and nursing note reviewed.   Constitutional:        General: She is not in acute distress.     Appearance: Normal appearance. She is well-developed. She is not diaphoretic.   HENT:      Head: Normocephalic and atraumatic.   Cardiovascular:      Rate and Rhythm: Normal rate and regular rhythm.   Pulmonary:      Effort: Pulmonary effort is normal. No respiratory distress.      Breath sounds: Normal breath sounds.   Musculoskeletal:         General: Swelling and tenderness present.      Cervical back: Normal range of motion.      Comments: 3 small raised bites to the medial left calf just above the ankle with erythema and tenderness   Skin:     General: Skin is warm and dry.      Capillary Refill: Capillary refill takes less than 2 seconds.      Coloration: Skin is not pale.      Findings: No erythema or rash.   Neurological:      Mental Status: She is alert and oriented to person, place, and time.      Motor: No abnormal muscle tone.      Coordination: Coordination normal.      Deep Tendon Reflexes: Reflexes are normal and symmetric.   Psychiatric:         Behavior: Behavior normal.         Thought Content: Thought content normal.         Judgment: Judgment normal.           ED Course   Labs Reviewed - No data to display     US VENOUS DOPPLER LEG LEFT - DIAG IMG (CPT=93971)    Result Date: 5/31/2024  PROCEDURE:  US VENOUS DOPPLER LEG LEFT - DIAG IMG (CPT=93971)  COMPARISON:  None.  INDICATIONS:  bug bite on left lower leg, now swelling and paito calf  TECHNIQUE:  Real time, grey scale, and duplex ultrasound was used to evaluate the lower extremity venous system. B-mode two-dimensional images of the vascular structures, Doppler spectral analysis, and color flow.  Doppler imaging were performed.  The following veins were imaged:  Common, deep, and superficial femoral, popliteal, sapheno-femoral junction, posterior tibial veins, and the contralateral common femoral vein.  PATIENT STATED HISTORY: (As transcribed by Technologist)  Left leg swelling.    FINDINGS:   EXTREMITY EXAMINED:  Left lower extremity SAPHENOFEMORAL JUNCTION:  No reflux. THROMBI:  None visible. COMPRESSION:  Normal compressibility, phasicity, and augmentation. OTHER:  Negative.            CONCLUSION:  No evidence of DVT in the left lower extremity.   LOCATION:  [facility]    Dictated by (CST): Mikey Chung MD on 5/31/2024 at 7:18 PM     Finalized by (CST): Mikey Chung MD on 5/31/2024 at 7:18 PM              MDM          Medical Decision Making  74-year-old female who comes in today with bug bites present for 6 days on the left medial calf now with redness and swelling along with tenderness to the area.  No fevers no chills.  No red streaking up the leg.    Problems Addressed:  Bug bite with infection, initial encounter: acute illness or injury  Lower extremity edema: acute illness or injury    Amount and/or Complexity of Data Reviewed  Radiology: ordered and independent interpretation performed. Decision-making details documented in ED Course.     Details: I personally reviewed the patient's ultrasound there is no evidence of acute DVT    Risk  OTC drugs.  Prescription drug management.  Risk Details: Clinical Impression: Bug bite with localized reaction and cellulitis of left lower extremity      The differential diagnosis before testing included DVT, local reaction, cellulitis, which is a medical condition that poses a threat to life/function.       Steroid cream, oral antibiotics discussed with the patient elevating, try to avoid itching.  If red streaking or worsening symptoms be reevaluated.          Disposition and Plan     Clinical Impression:  1. Bug bite with infection, initial encounter    2. Lower extremity edema         Disposition:  Discharge  5/31/2024  7:27 pm    Follow-up:  Princess Gregorio MD  [address] [place] [id number]  [phone]    Schedule an appointment as soon as possible for a visit   If symptoms worsen          Medications Prescribed:  Discharge Medication List as of 5/31/2024   7:30 PM        START taking these medications    Details   clobetasol 0.05 % External Cream Apply 1 Application topically 2 (two) times daily for 10 days., Normal, Disp-30 g, R-0      cetirizine 10 MG Oral Tab Take 1 tablet (10 mg total) by mouth daily., Normal, Disp-30 tablet, R-0      cephalexin 500 MG Oral Cap Take 1 capsule (500 mg total) by mouth 3 (three) times daily for 7 days., Normal, Disp-21 capsule, R-0           This report has been produced using speech recognition software and may contain errors related to that system including, but not limited to, errors in grammar, punctuation, and spelling, as well as words and phrases that possibly may have been recognized inappropriately.  If there are any questions or concerns, contact the dictating provider for clarification.     NOTE: The 21st Century Cares Act makes medical notes available to patients.  Be advised that this is a medical document written in medical language and may contain abbreviations or verbiage that is unfamiliar or direct.  It is primarily intended to carry relevant historical information, physical exam findings, and the clinical assessment of the physician.

## 2024-09-12 ENCOUNTER — OFFICE VISIT (OUTPATIENT)
Dept: FAMILY MEDICINE CLINIC | Facility: CLINIC | Age: 74
End: 2024-09-12
Payer: MEDICARE

## 2024-09-12 VITALS
OXYGEN SATURATION: 98 % | SYSTOLIC BLOOD PRESSURE: 126 MMHG | HEART RATE: 74 BPM | BODY MASS INDEX: 33 KG/M2 | TEMPERATURE: 98 F | WEIGHT: 191 LBS | RESPIRATION RATE: 16 BRPM | DIASTOLIC BLOOD PRESSURE: 72 MMHG

## 2024-09-12 DIAGNOSIS — M20.12 VALGUS DEFORMITY OF GREAT TOES, BILATERAL: ICD-10-CM

## 2024-09-12 DIAGNOSIS — S90.425A BLISTER OF TOE WITH INFECTION, LEFT, INITIAL ENCOUNTER: Primary | ICD-10-CM

## 2024-09-12 DIAGNOSIS — L08.9 BLISTER OF TOE WITH INFECTION, LEFT, INITIAL ENCOUNTER: Primary | ICD-10-CM

## 2024-09-12 DIAGNOSIS — M20.11 VALGUS DEFORMITY OF GREAT TOES, BILATERAL: ICD-10-CM

## 2024-09-12 PROCEDURE — 99202 OFFICE O/P NEW SF 15 MIN: CPT | Performed by: NURSE PRACTITIONER

## 2024-09-12 RX ORDER — CEFADROXIL 500 MG/1
500 CAPSULE ORAL 2 TIMES DAILY
Qty: 14 CAPSULE | Refills: 0 | Status: SHIPPED | OUTPATIENT
Start: 2024-09-12 | End: 2024-09-19

## 2024-09-12 NOTE — PROGRESS NOTES
Subjective:   Patient ID: Sudha Patrick is a 74 year old female.    Patient presents for evaluation of swelling of left foot with possible toe infection. She has a history of bunion with overlapping toes to her left foot and frequently has breakdown between the toes. Over the last week she has noted increased redness/swelling to the left great toe, as well as mild foot swelling, accompanied by chills and pain. She denies fever, cough, shortness of breath. Has tried occasional Ibuprofen as well as topical Neosporin for relief. She is followed by podiatry who is treating her nails with antifungal topical application.        History/Other:   Review of Systems   Musculoskeletal:  Positive for joint swelling.   Skin:         As above in HPI   All other systems reviewed and are negative.    Current Outpatient Medications   Medication Sig Dispense Refill    amLODIPine 5 MG Oral Tab Take 1 tablet (5 mg total) by mouth daily. 90 tablet 3    Benazepril HCl 10 MG Oral Tab Take 1 tablet (10 mg total) by mouth daily. 90 tablet 3     Allergies:No Known Allergies    /72   Pulse 74   Temp 97.7 °F (36.5 °C) (Oral)   Resp 16   Wt 191 lb (86.6 kg)   LMP  (LMP Unknown)   SpO2 98%   BMI 32.79 kg/m²       Objective:   Physical Exam  Constitutional:       General: She is not in acute distress.     Appearance: Normal appearance. She is not ill-appearing.   HENT:      Head: Normocephalic and atraumatic.   Cardiovascular:      Rate and Rhythm: Normal rate and regular rhythm.      Pulses: Normal pulses.   Pulmonary:      Effort: Pulmonary effort is normal. No respiratory distress.      Breath sounds: Normal breath sounds.   Musculoskeletal:         General: Deformity present. Normal range of motion.      Cervical back: Normal range of motion and neck supple.      Left ankle: Swelling present. No deformity.      Left foot: Bunion present.      Comments: Left lateral ankle with mild swelling, left great toe with skin  breakdown intertrigally as well as to dorsal toe from MIP to PIP joint. Distal pulses equal with sensation intact. Deformity of overlapping second and third toe noted.    Lymphadenopathy:      Cervical: No cervical adenopathy.   Skin:     General: Skin is warm and dry.      Capillary Refill: Capillary refill takes less than 2 seconds.      Findings: Erythema present.   Neurological:      General: No focal deficit present.      Mental Status: She is alert.         Assessment & Plan:   1. Blister of toe with infection, left, initial encounter    2. Valgus deformity of great toes, bilateral        No orders of the defined types were placed in this encounter.      Meds This Visit:  Requested Prescriptions     Signed Prescriptions Disp Refills    cefadroxil 500 MG Oral Cap 14 capsule 0     Sig: Take 1 capsule (500 mg total) by mouth 2 (two) times daily for 7 days.     Patient Instructions   Please start antibiotic twice daily by mouth for one week. Take with food.  You may stop Neosporin as this may be causing worsening superficial infection.Consider topical Bacitracin if an antibiotic is needed.  May take Ibuprofen 400 mg every 6 hours if needed for pain.   Keep foot elevated as much as possible. May use a covered ice pack to the ankle/area for comfort.  Wash the area with gentle soap/water; pat dry and dry between toes. Consider moleskin or other soft pad between the toes for comfort.  Consider anti fungal powder between the toes after washing.  Follow up with foot surgeon or primary care if symptoms persist. Seek emergency care for worsening symptoms/pain.  Medication use and risk/benefit discussed. Skin care discussed. Follow up with podiatry or PCP if symptoms persist. Patient verbalized understanding and agrees to plan.

## 2024-09-12 NOTE — PATIENT INSTRUCTIONS
Please start antibiotic twice daily by mouth for one week. Take with food.  You may stop Neosporin as this may be causing worsening superficial infection.Consider topical Bacitracin if an antibiotic is needed.  May take Ibuprofen 400 mg every 6 hours if needed for pain.   Keep foot elevated as much as possible. May use a covered ice pack to the ankle/area for comfort.  Wash the area with gentle soap/water; pat dry and dry between toes. Consider moleskin or other soft pad between the toes for comfort.  Consider anti fungal powder between the toes after washing.  Follow up with foot surgeon or primary care if symptoms persist. Seek emergency care for worsening symptoms/pain.

## 2024-11-07 ENCOUNTER — MED REC SCAN ONLY (OUTPATIENT)
Dept: INTERNAL MEDICINE CLINIC | Facility: CLINIC | Age: 74
End: 2024-11-07

## 2025-01-26 ENCOUNTER — WALK IN (OUTPATIENT)
Dept: URGENT CARE | Age: 75
End: 2025-01-26
Attending: EMERGENCY MEDICINE

## 2025-01-26 ENCOUNTER — HOSPITAL ENCOUNTER (OUTPATIENT)
Dept: GENERAL RADIOLOGY | Age: 75
Discharge: HOME | End: 2025-01-26
Attending: EMERGENCY MEDICINE

## 2025-01-26 VITALS
WEIGHT: 170 LBS | DIASTOLIC BLOOD PRESSURE: 74 MMHG | OXYGEN SATURATION: 98 % | HEART RATE: 66 BPM | TEMPERATURE: 98.5 F | SYSTOLIC BLOOD PRESSURE: 148 MMHG | RESPIRATION RATE: 16 BRPM

## 2025-01-26 DIAGNOSIS — S69.92XA INJURY OF LEFT WRIST, INITIAL ENCOUNTER: ICD-10-CM

## 2025-01-26 DIAGNOSIS — R52 PAIN: Primary | ICD-10-CM

## 2025-01-26 DIAGNOSIS — R52 PAIN: ICD-10-CM

## 2025-01-26 PROCEDURE — 73130 X-RAY EXAM OF HAND: CPT

## 2025-01-26 PROCEDURE — 73110 X-RAY EXAM OF WRIST: CPT

## 2025-01-26 ASSESSMENT — ENCOUNTER SYMPTOMS
CHILLS: 0
FEVER: 0
SHORTNESS OF BREATH: 0
COUGH: 0
DIARRHEA: 0
VOMITING: 0
DIZZINESS: 0
BACK PAIN: 0
BRUISES/BLEEDS EASILY: 0
ABDOMINAL PAIN: 0
HEADACHES: 0
SORE THROAT: 0

## 2025-01-26 ASSESSMENT — PAIN SCALES - GENERAL
PAINLEVEL: 2
PAINLEVEL_OUTOF10: 2

## 2025-01-27 ENCOUNTER — TELEPHONE (OUTPATIENT)
Dept: ORTHOPEDICS | Age: 75
End: 2025-01-27

## 2025-01-27 ENCOUNTER — TELEPHONE (OUTPATIENT)
Dept: BARIATRICS/WEIGHT MGMT | Age: 75
End: 2025-01-27

## 2025-02-20 ENCOUNTER — TELEPHONE (OUTPATIENT)
Dept: INTERNAL MEDICINE CLINIC | Facility: CLINIC | Age: 75
End: 2025-02-20

## 2025-02-20 DIAGNOSIS — Z12.31 SCREENING MAMMOGRAM, ENCOUNTER FOR: Primary | ICD-10-CM

## 2025-03-14 DIAGNOSIS — I10 PRIMARY HYPERTENSION: ICD-10-CM

## 2025-03-17 RX ORDER — AMLODIPINE BESYLATE 5 MG/1
5 TABLET ORAL DAILY
Qty: 90 TABLET | Refills: 3 | Status: SHIPPED | OUTPATIENT
Start: 2025-03-17

## 2025-03-17 RX ORDER — BENAZEPRIL HYDROCHLORIDE 10 MG/1
10 TABLET ORAL DAILY
Qty: 90 TABLET | Refills: 3 | Status: SHIPPED | OUTPATIENT
Start: 2025-03-17

## 2025-03-17 NOTE — TELEPHONE ENCOUNTER
Refill Per Protocol     Requested Prescriptions   Pending Prescriptions Disp Refills    AMLODIPINE 5 MG Oral Tab [Pharmacy Med Name: amLODIPine Besylate Oral Tablet 5 MG] 90 tablet 0     Sig: TAKE ONE TABLET BY MOUTH ONE TIME DAILY       Hypertension Medications Protocol Passed - 3/17/2025  4:07 PM        Passed - CMP or BMP in past 12 months        Passed - Last BP reading less than 140/90     BP Readings from Last 1 Encounters:   09/12/24 126/72               Passed - In person appointment or virtual visit in the past 12 mos or appointment in next 3 mos     Recent Outpatient Visits              6 months ago Blister of toe with infection, left, initial encounter    [facility], [address] [place] [address] Pearl Bermudez APRN    Office Visit    11 months ago [facility]    [facility]    2 years ago Encounter for annual health examination    [facility] Princess Lopez MD    Office Visit    2 years ago Primary hypertension    [facility] Princess Lopez MD    Office Visit          Future Appointments         Provider Department Appt Notes    In 1 week WDR DEXA RM1; WDR DANIEL RM1 [facility]     In 3 weeks Princess Gregorio MD [facility] last px 4/02/2024                    Passed - EGFRCR or GFRNAA > 50     GFR Evaluation  EGFRCR: 102 , resulted on 5/16/2024          Passed - Medication is active on med list          BENAZEPRIL HCL 10 MG Oral Tab [Pharmacy Med Name: Benazepril HCl Oral Tablet 10 MG] 90 tablet 0     Sig: TAKE ONE TABLET BY MOUTH ONE TIME DAILY       Hypertension Medications Protocol Passed - 3/17/2025  4:07 PM        Passed - CMP or BMP in past 12 months        Passed - Last BP reading less than 140/90     BP  Readings from Last 1 Encounters:   09/12/24 126/72               Passed - In person appointment or virtual visit in the past 12 mos or appointment in next 3 mos     Recent Outpatient Visits              6 months ago Blister of toe with infection, left, initial encounter    [facility], [facility], [address] [place] [address] Pearl Bermudez APRN    Office Visit    11 months ago [facility]    Office Visit    2 years ago Encounter for annual health examination    [facility] Princess Lopez MD    Office Visit    2 years ago Primary hypertension    [facility] Princess Lopez MD    Office Visit          Future Appointments         Provider Department Appt Notes    In 1 week IGNACIA ANDERSON RM1; WDR DANIEL RM1 [facility]     In 3 weeks Princess Gregorio MD [facility] last px 4/02/2024                    Passed - EGFRCR or GFRNAA > 50     GFR Evaluation  EGFRCR: 102 , resulted on 5/16/2024          Passed - Medication is active on med list

## 2025-03-26 ENCOUNTER — HOSPITAL ENCOUNTER (OUTPATIENT)
Dept: MAMMOGRAPHY | Age: 75
Discharge: HOME | End: 2025-03-26
Attending: INTERNAL MEDICINE
Payer: MEDICARE

## 2025-03-26 DIAGNOSIS — Z12.31 SCREENING MAMMOGRAM, ENCOUNTER FOR: ICD-10-CM

## 2025-03-26 PROCEDURE — 77067 SCR MAMMO BI INCL CAD: CPT | Performed by: INTERNAL MEDICINE

## 2025-03-26 PROCEDURE — 77063 BREAST TOMOSYNTHESIS BI: CPT | Performed by: INTERNAL MEDICINE

## 2025-03-31 ENCOUNTER — OFFICE VISIT (OUTPATIENT)
Dept: INTERNAL MEDICINE CLINIC | Facility: CLINIC | Age: 75
End: 2025-03-31
Payer: MEDICARE

## 2025-03-31 VITALS
SYSTOLIC BLOOD PRESSURE: 117 MMHG | DIASTOLIC BLOOD PRESSURE: 74 MMHG | HEIGHT: 64 IN | BODY MASS INDEX: 31.58 KG/M2 | WEIGHT: 185 LBS | HEART RATE: 72 BPM | OXYGEN SATURATION: 97 %

## 2025-03-31 DIAGNOSIS — I65.01 STENOSIS OF RIGHT VERTEBRAL ARTERY: ICD-10-CM

## 2025-03-31 DIAGNOSIS — M31.6 TEMPORAL ARTERITIS (HCC): ICD-10-CM

## 2025-03-31 DIAGNOSIS — I10 PRIMARY HYPERTENSION: ICD-10-CM

## 2025-03-31 DIAGNOSIS — I77.71 CAROTID ARTERY DISSECTION (HCC): Primary | ICD-10-CM

## 2025-03-31 PROCEDURE — 99213 OFFICE O/P EST LOW 20 MIN: CPT

## 2025-03-31 RX ORDER — ASPIRIN 81 MG/1
81 TABLET ORAL DAILY
COMMUNITY

## 2025-03-31 RX ORDER — AMLODIPINE BESYLATE 10 MG/1
10 TABLET ORAL DAILY
COMMUNITY

## 2025-03-31 NOTE — PROGRESS NOTES
Subjective:   Sudha Patrick is a 74 year old female who presents for Hospital F/U     Admitted 3/23/25 to [facility]     ADMISSION DATE: 3/23/2025  ADMITTING PROVIDER: Luke Deluna DO  DISCHARGE DATE: 3/24/2025   DISCHARGING PHYSICIAN: Luke Deluna DO    ADMITTING DIAGNOSIS:  Active Hospital Problems  Stenosis of right vertebral artery    *Carotid artery dissection (HCC)    Past Medical History:   Diagnosis Date   HTN (hypertension)     DISCHARGE DIAGNOSIS:   Active Hospital Problems  Stenosis of right vertebral artery    *Carotid artery dissection (HCC)    Past Medical History:   Diagnosis Date   HTN (hypertension)     Carotid artery dissection: vasc surgery on consult, loaded with  and plavix 300  Vertebral artery stenosis: vascular surgery to evaluate  Facial pain/neck pain 2/2 temporal arteritis?: Improved. medrol dose pack, MRI benign, neurology consult, SLP consult  Leukocytosis: continue to monitor, elevated ESR and CRP, recheck in a few days  HTN: continue home ACEi and amlodipine  Obesity: Body mass index is 31.84 kg/m². Advised dietary and lifestyle modification, patient is motivated to improve. Continue with outpatient follow-up/care.    HOSPITAL COURSE:  Sudha Patrick is a 74 y.o. female with PMH as listed above presented to [facility] on 3/23/2025 with a chief complaint of Facial Pain (Left sided facial pain started yesterday night (930pm), ringing of left ear, flashing of lights to left eye, hard to swallow, took Aspirin 162mg at 0130, left side of head hurts too, kinder negative)  . The primary encounter diagnosis was Carotid artery dissection (HCC). A diagnosis of Stenosis of right vertebral artery was also pertinent to this visit.. The details of management for all relevant diagnoses during the hospital stay are as mentioned above.     Patient was admitted for facial pain as well as some ringing in the left ear and flashing  lights. CTA revealed a 1.2 cm dissection of the left internal carotid artery due to which vascular surgery was consulted. This was determined to be noncontributory and patient was instructed to follow up with vascular surgery in a month. CRP and ESR were obtained which were elevated. Neurology service was consulted and the patient was started on steroids. Patient's pain had improved suggesting that the patient had temporal arteritis. Patient was evaluated today and determined to be medically stable for discharge home with outpatient primary care follow up and. Steroid taper. Patient is to recheck her ESR and CRP in a few days.    Patient showed symptomatic and clinical improvement, remained vitally stable prior to discharge. Case was reviewed and patient was cleared for discharge to Home or Self Care [1] by all consultants with follow ups and medications as listed below. Discharge instructions, medications and followup appointment were discussed with the patient/family and after visit summary was given by RN.     Ambulatory referral to Primary Care Provider   Question: Follow Up Timeframe: Answer: 10-14 days     Ambulatory referral to Neurology   Question: Follow Up Timeframe: Answer: 10-14 days     Ambulatory referral to Vascular Surgery   Question: Follow Up Timeframe: Answer: 1 month     START taking these medications     Instructions Last Dose Given Next Dose Due   aspirin 81 MG EC tablet  Start taking on: March 25, 2025    Take 1 tablet (81 mg total) by mouth 1 (one) time each day.    methylPREDNISolone 4 MG tablets  Commonly known as: Medrol Dosepak    Follow schedule on package instructions    CHANGE how you take these medications     Instructions Last Dose Given Next Dose Due   amLODIPine 10 MG tablet  Commonly known as: Norvasc  Start taking on: March 25, 2025  What changed:   medication strength  how much to take    Take 1 tablet (10 mg total) by mouth 1 (one) time each day.         Presents today for  hospital f/u   Pain resolved completely when she was on the medrol dose pack but now that she has stopped it it is worse again   Hospital called her and told her to stop the steroids to recheck the sed rate and CRP and wants her to check this tomorrow   Having worse pain on the left side when she opens and closes her mouth and when chewing   Is planning to call her dentist, had a cleaning in February and they did x-rays and everything was fine at that time   Has multiple implants     Has not yet scheduled with neurology and vascular surgery     Checks BP at home, recent readings 106/73 and 103/56   Her amlodipine was increased from 5mg to 10mg in the hospital   Feels tired, no dizziness    She will keep monitoring and cut the pills in half if her BP continues to be low or she develops dizziness    History/Other:    Chief Complaint Reviewed and Verified  No Further Nursing Notes to   Review  Tobacco Reviewed  Allergies Reviewed  Medications Reviewed  OB   Status Reviewed         Tobacco:  She has never smoked tobacco.    Current Outpatient Medications   Medication Sig Dispense Refill    amLODIPine 10 MG Oral Tab Take 1 tablet (10 mg total) by mouth daily.      aspirin 81 MG Oral Tab EC Take 1 tablet (81 mg total) by mouth daily.      Benazepril HCl 10 MG Oral Tab Take 1 tablet (10 mg total) by mouth daily. 90 tablet 3     Review of Systems:  Review of Systems  10 point review of systems otherwise negative with the exception of HPI and assessment and plan    Objective:   /74   Pulse 72   Ht 5' 4\" (1.626 m)   Wt 185 lb (83.9 kg)   LMP  (LMP Unknown)   SpO2 97%   BMI 31.76 kg/m²  Estimated body mass index is 31.76 kg/m² as calculated from the following:    Height as of this encounter: 5' 4\" (1.626 m).    Weight as of this encounter: 185 lb (83.9 kg).  Physical Exam  Vitals reviewed.   Constitutional:       General: She is not in acute distress.     Appearance: Normal appearance. She is well-developed.    Eyes:      Extraocular Movements: Extraocular movements intact.      Pupils: Pupils are equal, round, and reactive to light.   Cardiovascular:      Rate and Rhythm: Normal rate and regular rhythm.      Heart sounds: Normal heart sounds.   Pulmonary:      Effort: Pulmonary effort is normal.      Breath sounds: Normal breath sounds.   Musculoskeletal:      Comments: Exquisite point tenderness to palpation of the left TMJ    Skin:     General: Skin is warm and dry.   Neurological:      General: No focal deficit present.      Mental Status: She is alert and oriented to person, place, and time.      Cranial Nerves: No dysarthria or facial asymmetry.      Sensory: Sensation is intact.      Motor: Motor function is intact.      Coordination: Coordination is intact.         Assessment & Plan:   1. Carotid artery dissection (HCC) (Primary)  -     Sed Rate, Westergren (Automated); Future; Expected date: 03/31/2025  -     C-Reactive Protein; Future; Expected date: 03/31/2025  2. Stenosis of right vertebral artery  -     Sed Rate, Westergren (Automated); Future; Expected date: 03/31/2025  -     C-Reactive Protein; Future; Expected date: 03/31/2025  3. Primary hypertension  Continue to monitor BP, if <100 systolic or low 100s with dizziness, go back to 5mg amlodipine dose and notify the office     Call neurology and vascular surgery for appointment MARGARITA Pierce, 3/31/2025, 1:28 PM

## 2025-04-07 ENCOUNTER — OFFICE VISIT (OUTPATIENT)
Dept: INTERNAL MEDICINE CLINIC | Facility: CLINIC | Age: 75
End: 2025-04-07
Payer: MEDICARE

## 2025-04-07 VITALS
SYSTOLIC BLOOD PRESSURE: 122 MMHG | BODY MASS INDEX: 31.65 KG/M2 | HEIGHT: 64 IN | OXYGEN SATURATION: 99 % | DIASTOLIC BLOOD PRESSURE: 71 MMHG | TEMPERATURE: 97 F | WEIGHT: 185.38 LBS | HEART RATE: 62 BPM

## 2025-04-07 DIAGNOSIS — I77.71 CAROTID ARTERY DISSECTION (HCC): ICD-10-CM

## 2025-04-07 DIAGNOSIS — Z11.59 NEED FOR HEPATITIS C SCREENING TEST: ICD-10-CM

## 2025-04-07 DIAGNOSIS — I65.01 STENOSIS OF RIGHT VERTEBRAL ARTERY: ICD-10-CM

## 2025-04-07 DIAGNOSIS — I10 PRIMARY HYPERTENSION: ICD-10-CM

## 2025-04-07 DIAGNOSIS — M31.6 TEMPORAL ARTERITIS (HCC): ICD-10-CM

## 2025-04-07 DIAGNOSIS — Z00.00 ENCOUNTER FOR ANNUAL HEALTH EXAMINATION: Primary | ICD-10-CM

## 2025-04-07 PROCEDURE — G0439 PPPS, SUBSEQ VISIT: HCPCS | Performed by: INTERNAL MEDICINE

## 2025-04-07 RX ORDER — BENAZEPRIL HYDROCHLORIDE 10 MG/1
10 TABLET ORAL DAILY
Qty: 90 TABLET | Refills: 3 | Status: SHIPPED | OUTPATIENT
Start: 2025-04-07

## 2025-04-07 RX ORDER — AMLODIPINE BESYLATE 10 MG/1
10 TABLET ORAL DAILY
Qty: 90 TABLET | Refills: 3 | Status: SHIPPED | OUTPATIENT
Start: 2025-04-07

## 2025-04-07 RX ORDER — BENAZEPRIL HYDROCHLORIDE 10 MG/1
10 TABLET ORAL DAILY
Qty: 90 TABLET | Refills: 3 | Status: SHIPPED | OUTPATIENT
Start: 2025-04-07 | End: 2025-04-07

## 2025-04-07 RX ORDER — METHYLPREDNISOLONE 4 MG/1
TABLET ORAL
COMMUNITY
Start: 2025-03-24 | End: 2025-04-07 | Stop reason: ALTCHOICE

## 2025-04-07 RX ORDER — AMLODIPINE BESYLATE 10 MG/1
10 TABLET ORAL DAILY
Qty: 90 TABLET | Refills: 3 | Status: SHIPPED | OUTPATIENT
Start: 2025-04-07 | End: 2025-04-07

## 2025-04-07 NOTE — PROGRESS NOTES
Subjective:   Sudha Patrick is a 74 year old female who presents for a Medicare Subsequent Annual Wellness visit (Pt already had Initial Annual Wellness) and scheduled follow up of multiple significant but stable problems.     Patient comes for her Medicare physical  Last mn -3/2025 [facility] -  Carotid artery dissection: vasc surgery on consult, loaded with  and plavix 300--now only on asa   Vertebral artery stenosis: vascular surgery -DR urszula crow apt this  thurs and neuro tomorrow -dr ames   She saw her dentist last week and was told it was not teeth issues that she was having but may be to be evaluated for autoimmune disorders and she will see the neurologist and discussed with him about this did tell patient that they did do this 2 weeks ago-- under labs   Will go for temporary artery biopsy tomorrow         HISTORY  New pt   C/c establish care   C/o seen last her pcp 2/11/2022     Retired in 2017 and moved here then   Used to be seen in [facility] in [place]      Retired nurse -med surg , lives alone      PMH  HTN  Styes   Hammertoes and venkatesh   --------------------------------------------------------------------------------------------------    History/Other:   Fall Risk Assessment:   She has been screened for Falls and is low risk.      Cognitive Assessment:   She had a completely normal cognitive assessment - see flowsheet entries     Functional Ability/Status:   Sudha Patrick has a completely normal functional assessment. See flowsheet for details.        Depression Screening (PHQ):  PHQ-2 SCORE: 0  , done 4/7/2025             Advanced Directives:   She does have a Living Will but we do NOT have it on file in Epic.    She does have a POA but we do NOT have it on file in Epic.    Discussed Advance Care Planning with patient (and family/surrogate if present). Standard forms made available to patient in After Visit Summary.      Patient Active Problem List    Diagnosis    Primary hypertension    Carotid artery dissection (HCC)    Stenosis of right vertebral artery    Temporal arteritis (HCC)     Allergies:  She has No Known Allergies.    Current Medications:  Outpatient Medications Marked as Taking for the 25 encounter (Office Visit) with Princess Gregorio MD   Medication Sig    amLODIPine 10 MG Oral Tab Take 1 tablet (10 mg total) by mouth daily.    Benazepril HCl 10 MG Oral Tab Take 1 tablet (10 mg total) by mouth daily.    aspirin 81 MG Oral Tab EC Take 1 tablet (81 mg total) by mouth daily.       Medical History:  She  has a past medical history of Allergic rhinitis, Essential hypertension, and Obesity.  Surgical History:  She  has a past surgical history that includes other surgical history; needle biopsy left (); ; and tubal ligation.   Family History:  Her family history includes Cancer in her sister; Depression in her sister; Diabetes in her father; Heart Disorder in her father; Hypertension in her father and mother; Lipids in her father.  Social History:  She  reports that she has never smoked. She has never been exposed to tobacco smoke. She has never used smokeless tobacco. She reports that she does not currently use alcohol. She reports that she does not use drugs.    Tobacco:  She has never smoked tobacco.    CAGE Alcohol Screen:   CAGE screening score of 0 on 2025, showing low risk of alcohol abuse.      Patient Care Team:  Princess Gregorio MD as PCP - General (Internal Medicine)    Review of Systems  GENERAL: feels well otherwise  SKIN: denies any unusual skin lesions  EYES: denies blurred vision or double vision-- left floaters   HEENT: denies nasal congestion, sinus pain or ST  LUNGS: denies shortness of breath with exertion  CARDIOVASCULAR: denies chest pain on exertion  GI: denies abdominal pain, denies heartburn  : denies dysuria, vaginal discharge or itching, +  complaint of urinary incontinence   MUSCULOSKELETAL: denies back pain-  sees the chiropractor   NEURO: + headaches  PSYCHE: denies depression or anxiety  HEMATOLOGIC: denies hx of anemia  ENDOCRINE: denies thyroid history  ALL/ASTHMA: denies hx of allergy or asthma    Objective:   Physical Exam  GENERAL: well developed, well nourished,in no apparent distress  SKIN: no rashes,no suspicious lesions  HEENT: atraumatic, normocephalic,ears and throat are clear, no frontal or maxillary sinus tenderness, pupils equal reactive to light bilaterally, extraocular  muscles intact  NECK: supple,no adenopathy,nontender   LUNGS: clear to auscultation, no wheeze  CARDIO: RRR without murmur  GI: good BS's,no masses or tenderness  EXTREMITIES: no cyanosis, or edema  BREAST: Bilateral breasts without any lumps, bilateral axilla without any lymphadenopathy, no nipple retraction or  discharge        /71   Pulse 62   Temp 96.9 °F (36.1 °C)   Ht 5' 4\" (1.626 m)   Wt 185 lb 6.4 oz (84.1 kg)   LMP  (LMP Unknown)   SpO2 99%   BMI 31.82 kg/m²  Estimated body mass index is 31.82 kg/m² as calculated from the following:    Height as of this encounter: 5' 4\" (1.626 m).    Weight as of this encounter: 185 lb 6.4 oz (84.1 kg).    Medicare Hearing Assessment:   Hearing Screening    Screening Method: Questionnaire  I have a problem hearing over the telephone: No I have trouble following the conversations when two or more people are talking at the same time: No   I have trouble understanding things on the TV: No I have to strain to understand conversations: No   I have to worry about missing the telephone ring or doorbell: No I have trouble hearing conversations in a noisy background such as a crowded room or restaurant: No   I get confused about where sounds come from: No I misunderstand some words in a sentence and need to ask people to repeat themselves: No   I especially have trouble understanding the speech of women and children: No I have trouble understanding the speaker in a large room such as at a  meeting or place of [employer]: No   Many people I talk to seem to mumble (or don't speak clearly): No People get annoyed because I misunderstand what they say: No   I misunderstand what others are saying and make inappropriate responses: No I avoid social activities because I cannot hear well and fear I will reply improperly: No   Family members and friends have told me they think I may have hearing loss: No                   Assessment & Plan:   Sudha Patrick is a 74 year old female who presents for a Medicare Assessment.     1. Encounter for annual health examination (Primary)  Advised patient to watch what she eats and  exercise, seatbelt use no texting driving, sunscreen use advised    2. Primary hypertension  -     Discontinue: Benazepril HCl; Take 1 tablet (10 mg total) by mouth daily.  Dispense: 90 tablet; Refill: 3  -     Discontinue: amLODIPine Besylate; Take 1 tablet (10 mg total) by mouth daily.  Dispense: 90 tablet; Refill: 3  -     Lipid Panel; Future; Expected date: 04/07/2025  -     TSH W Reflex To Free T4; Future; Expected date: 04/07/2025  -     amLODIPine Besylate; Take 1 tablet (10 mg total) by mouth daily.  Dispense: 90 tablet; Refill: 3  -     Benazepril HCl; Take 1 tablet (10 mg total) by mouth daily.  Dispense: 90 tablet; Refill: 3  Advised pt to follow a low salt , low sodium (including fast foods and processed foods), can look up DASH diet, exercise 30 min a day , monitor bp     3. Need for hepatitis C screening test  -     HCV Antibody; Future; Expected date: 04/07/2025  Ordered   4. Temporal arteritis (HCC)  Will go for biopsy   5. Carotid artery dissection (HCC)  F/u with vascular surgeon   6. Stenosis of right vertebral artery  F/u with vascular surgeon                preventative medicine   Labs -5/2024 and 3/2025   Pap- 2018 , defer further   Mammogram - 3/2025, rpt scheduled   dexa 3/11/2021,2024   cscope - 3/12/2021 rpt 5 yrs IE 2026    The patient indicates understanding of  these issues and agrees to the plan.  Reinforced healthy diet, lifestyle, and exercise.      Return in 3 months (on 7/7/2025).     Princess Gregorio MD, 4/7/2025     Supplementary Documentation:   General Health:  In the past six months, have you lost more than 10 pounds without trying?: 2 - No  Has your appetite been poor?: No  Type of Diet: Balanced  How does the patient maintain a good energy level?: Daily Walks, Stretching  How would you describe your daily physical activity?: Light  How would you describe your current health state?: Fair  How do you maintain positive mental well-being?: Social Interaction, Games, Visiting Friends, Visiting Family  On a scale of 0 to 10, with 0 being no pain and 10 being severe pain, what is your pain level?: 0 - (None)  In the past six months, have you experienced urine leakage?: 1-Yes  At any time do you feel concerned for the safety/well-being of yourself and/or your children, in your home or elsewhere?: No  Have you had any immunizations at another office such as Influenza, Hepatitis B, Tetanus, or Pneumococcal?: No    Health Maintenance   Topic Date Due    COVID-19 Vaccine (7 - 2024-25 season) 09/01/2024    Annual Physical  04/02/2025    Influenza Vaccine (Season Ended) 10/01/2025    Colorectal Cancer Screening  03/12/2026    Mammogram  03/26/2026    DEXA Scan  Completed    Annual Depression Screening  Completed    Fall Risk Screening (Annual)  Completed    Pneumococcal Vaccine: 50+ Years  Completed    Zoster Vaccines  Completed    Meningococcal B Vaccine  Aged Out

## 2025-04-09 ENCOUNTER — LAB ENCOUNTER (OUTPATIENT)
Dept: LAB | Age: 75
End: 2025-04-09
Attending: INTERNAL MEDICINE
Payer: MEDICARE

## 2025-04-09 DIAGNOSIS — Z11.59 NEED FOR HEPATITIS C SCREENING TEST: ICD-10-CM

## 2025-04-09 DIAGNOSIS — I77.71 CAROTID ARTERY DISSECTION (HCC): ICD-10-CM

## 2025-04-09 DIAGNOSIS — I65.01 STENOSIS OF RIGHT VERTEBRAL ARTERY: ICD-10-CM

## 2025-04-09 DIAGNOSIS — I10 PRIMARY HYPERTENSION: ICD-10-CM

## 2025-04-09 LAB
CHOLEST SERPL-MCNC: 172 MG/DL (ref ?–200)
CRP SERPL-MCNC: 1.4 MG/DL (ref ?–0.5)
ERYTHROCYTE [SEDIMENTATION RATE] IN BLOOD: 52 MM/HR (ref 0–30)
HDLC SERPL-MCNC: 54 MG/DL (ref 40–59)
LDLC SERPL CALC-MCNC: 108 MG/DL (ref ?–100)
NONHDLC SERPL-MCNC: 118 MG/DL (ref ?–130)
TRIGL SERPL-MCNC: 48 MG/DL (ref 30–149)
TSI SER-ACNC: 0.99 UIU/ML (ref 0.55–4.78)
VLDLC SERPL CALC-MCNC: 8 MG/DL (ref 0–30)

## 2025-04-09 PROCEDURE — 36415 COLL VENOUS BLD VENIPUNCTURE: CPT

## 2025-04-09 PROCEDURE — 80061 LIPID PANEL: CPT

## 2025-04-09 PROCEDURE — 86140 C-REACTIVE PROTEIN: CPT

## 2025-04-09 PROCEDURE — 85652 RBC SED RATE AUTOMATED: CPT

## 2025-04-09 PROCEDURE — 84443 ASSAY THYROID STIM HORMONE: CPT

## 2025-04-15 ENCOUNTER — APPOINTMENT (OUTPATIENT)
Dept: BARIATRICS/WEIGHT MGMT | Age: 75
End: 2025-04-15

## 2025-04-15 VITALS
WEIGHT: 185.8 LBS | OXYGEN SATURATION: 97 % | HEART RATE: 67 BPM | RESPIRATION RATE: 14 BRPM | SYSTOLIC BLOOD PRESSURE: 117 MMHG | DIASTOLIC BLOOD PRESSURE: 55 MMHG | HEIGHT: 62 IN | TEMPERATURE: 97.2 F | BODY MASS INDEX: 34.19 KG/M2

## 2025-04-15 DIAGNOSIS — Z13.1 ENCOUNTER FOR SCREENING FOR DIABETES MELLITUS: ICD-10-CM

## 2025-04-15 DIAGNOSIS — E66.812 CLASS 2 OBESITY: Primary | ICD-10-CM

## 2025-04-15 DIAGNOSIS — E78.2 MIXED HYPERLIPIDEMIA: ICD-10-CM

## 2025-04-15 DIAGNOSIS — I10 ESSENTIAL (PRIMARY) HYPERTENSION: ICD-10-CM

## 2025-04-15 PROBLEM — Z86.79 HISTORY OF CAROTID ARTERY DISSECTION: Status: ACTIVE | Noted: 2025-04-15

## 2025-04-15 PROCEDURE — 99205 OFFICE O/P NEW HI 60 MIN: CPT | Performed by: FAMILY MEDICINE

## 2025-04-15 RX ORDER — TOPIRAMATE 25 MG/1
TABLET, FILM COATED ORAL
Qty: 60 TABLET | Refills: 0 | Status: SHIPPED | OUTPATIENT
Start: 2025-04-15

## 2025-04-15 RX ORDER — MULTIVIT WITH MINERALS/LUTEIN
1000 TABLET ORAL DAILY
COMMUNITY

## 2025-04-15 RX ORDER — ASPIRIN 81 MG/1
81 TABLET ORAL DAILY
COMMUNITY

## 2025-04-15 ASSESSMENT — PAIN SCALES - GENERAL: PAINLEVEL_OUTOF10: 0

## 2025-04-16 ENCOUNTER — HOSPITAL ENCOUNTER (OUTPATIENT)
Dept: MAMMOGRAPHY | Facility: HOSPITAL | Age: 75
Discharge: HOME | End: 2025-04-16
Attending: INTERNAL MEDICINE
Payer: MEDICARE

## 2025-04-16 DIAGNOSIS — R92.2 INCONCLUSIVE MAMMOGRAM: ICD-10-CM

## 2025-04-16 PROCEDURE — 77061 BREAST TOMOSYNTHESIS UNI: CPT | Performed by: INTERNAL MEDICINE

## 2025-04-16 PROCEDURE — 76642 ULTRASOUND BREAST LIMITED: CPT | Performed by: INTERNAL MEDICINE

## 2025-04-16 PROCEDURE — 77065 DX MAMMO INCL CAD UNI: CPT | Performed by: INTERNAL MEDICINE

## 2025-04-17 ENCOUNTER — MED REC SCAN ONLY (OUTPATIENT)
Dept: INTERNAL MEDICINE CLINIC | Facility: CLINIC | Age: 75
End: 2025-04-17

## 2025-04-21 ENCOUNTER — TELEPHONE (OUTPATIENT)
Dept: INTERNAL MEDICINE CLINIC | Facility: CLINIC | Age: 75
End: 2025-04-21

## 2025-04-21 NOTE — TELEPHONE ENCOUNTER
Patient sent a Dizziont request for an appointment between 07/07-07/21. There are no openings until August. Per patients office visit from 04/07/2025  would like to her in 3 months on 07/07. Can we use a res24 slot?

## 2025-04-22 ENCOUNTER — LAB SERVICES (OUTPATIENT)
Dept: LAB | Age: 75
End: 2025-04-22

## 2025-04-22 DIAGNOSIS — I10 ESSENTIAL (PRIMARY) HYPERTENSION: ICD-10-CM

## 2025-04-22 DIAGNOSIS — Z13.1 ENCOUNTER FOR SCREENING FOR DIABETES MELLITUS: ICD-10-CM

## 2025-04-22 DIAGNOSIS — E66.812 CLASS 2 OBESITY: ICD-10-CM

## 2025-04-22 DIAGNOSIS — E78.2 MIXED HYPERLIPIDEMIA: ICD-10-CM

## 2025-04-22 LAB — HBA1C MFR BLD: 6.1 % (ref 4.5–5.6)

## 2025-04-22 PROCEDURE — 36415 COLL VENOUS BLD VENIPUNCTURE: CPT | Performed by: FAMILY MEDICINE

## 2025-04-22 PROCEDURE — 83036 HEMOGLOBIN GLYCOSYLATED A1C: CPT | Performed by: CLINICAL MEDICAL LABORATORY

## 2025-04-22 PROCEDURE — 83525 ASSAY OF INSULIN: CPT | Performed by: CLINICAL MEDICAL LABORATORY

## 2025-04-23 LAB
FASTING DURATION TIME PATIENT: NORMAL H
INSULIN P FAST SERPL-ACNC: 10 MUNITS/L (ref 3–28)

## 2025-04-24 ENCOUNTER — RESULTS FOLLOW-UP (OUTPATIENT)
Dept: BARIATRICS/WEIGHT MGMT | Age: 75
End: 2025-04-24

## 2025-05-01 ENCOUNTER — APPOINTMENT (OUTPATIENT)
Dept: BARIATRICS/WEIGHT MGMT | Age: 75
End: 2025-05-01

## 2025-05-01 VITALS
WEIGHT: 186.9 LBS | HEART RATE: 63 BPM | TEMPERATURE: 97.4 F | BODY MASS INDEX: 34.39 KG/M2 | DIASTOLIC BLOOD PRESSURE: 68 MMHG | HEIGHT: 62 IN | SYSTOLIC BLOOD PRESSURE: 111 MMHG | OXYGEN SATURATION: 96 % | RESPIRATION RATE: 14 BRPM

## 2025-05-01 DIAGNOSIS — R63.5 ABNORMAL WEIGHT GAIN: Primary | ICD-10-CM

## 2025-05-01 RX ORDER — ASPIRIN 325 MG
325 TABLET ORAL DAILY
COMMUNITY

## 2025-05-01 ASSESSMENT — PAIN SCALES - GENERAL: PAINLEVEL_OUTOF10: 0

## 2025-05-14 ENCOUNTER — PATIENT MESSAGE (OUTPATIENT)
Dept: INTERNAL MEDICINE CLINIC | Facility: CLINIC | Age: 75
End: 2025-05-14

## 2025-05-14 ENCOUNTER — NURSE TRIAGE (OUTPATIENT)
Dept: INTERNAL MEDICINE CLINIC | Facility: CLINIC | Age: 75
End: 2025-05-14

## 2025-05-14 PROBLEM — E66.812 CLASS 2 OBESITY: Status: ACTIVE | Noted: 2025-04-15

## 2025-05-14 RX ORDER — TOPIRAMATE 25 MG/1
TABLET, FILM COATED ORAL
COMMUNITY
Start: 2025-04-15 | End: 2025-05-16 | Stop reason: ALTCHOICE

## 2025-05-14 RX ORDER — ATORVASTATIN CALCIUM 20 MG/1
20 TABLET, FILM COATED ORAL NIGHTLY
COMMUNITY
Start: 2025-04-16 | End: 2026-04-16

## 2025-05-14 NOTE — TELEPHONE ENCOUNTER
Under patient reported vitals it looks like it is on the lower side 100 over 50s-she should stop the medication and cont to monitor BP   Pt with mult medical issues so needs to f/u asap - ok to use res 24   Call back with bp after stopping meds   Goto ER if any sever sympt ,weakness , dizziness , cp, sob HA etc

## 2025-05-14 NOTE — TELEPHONE ENCOUNTER
Spoke with patient, (  Name and date of birth verified ) informed of Dr. Gregorio's  instructions below    Patient already took Amlodipine today, will not take tomorrow and will bring b/p readings to follow up appointment     Future Appointments   Date Time Provider Department Center   5/16/2025 10:00 AM Adilia Zapata APRN [facility]   7/7/2025 12:00 PM Princess Gregorio MD [facility]

## 2025-05-16 ENCOUNTER — OFFICE VISIT (OUTPATIENT)
Dept: INTERNAL MEDICINE CLINIC | Facility: CLINIC | Age: 75
End: 2025-05-16
Payer: MEDICARE

## 2025-05-16 VITALS
OXYGEN SATURATION: 96 % | HEART RATE: 63 BPM | TEMPERATURE: 97 F | DIASTOLIC BLOOD PRESSURE: 58 MMHG | SYSTOLIC BLOOD PRESSURE: 112 MMHG | BODY MASS INDEX: 31.99 KG/M2 | HEIGHT: 64 IN | WEIGHT: 187.38 LBS

## 2025-05-16 DIAGNOSIS — I10 PRIMARY HYPERTENSION: Primary | ICD-10-CM

## 2025-05-16 DIAGNOSIS — G89.29 CHRONIC LEFT HIP PAIN: ICD-10-CM

## 2025-05-16 DIAGNOSIS — M25.552 CHRONIC LEFT HIP PAIN: ICD-10-CM

## 2025-05-16 PROCEDURE — 99213 OFFICE O/P EST LOW 20 MIN: CPT | Performed by: NURSE PRACTITIONER

## 2025-05-16 NOTE — PROGRESS NOTES
Subjective:   Sudha Patrick is a 75 year old female with PMH HTN, carotid artery dissection, temporal arteritis who presents for Blood Pressure     HPI   Recent hospitalization - 3/23/25 - at [facility], was dx'd with chronic carotid artery dissection, she will have f/u scan in 6 mo, and at discharge her amlodipine was increased to 10mg.  She had previously been on 5 mg for years, and was stable on that dose.  3/31 and 4/7 visits with primary, and APN, she continued the 10 mg dose.  In the past couple of weeks, she has noticed that she is having BLE edema, she can barely get her feet into her shoes at the end of the day, blood pressure at home has been lower than usual, and she is feeling tired.  100/50, 108/52 was the highest.    She called the office to discuss this with nurse triage on 4 7, was told to stop taking amlodipine altogether as advised by Dr. Gregorio, and to follow-up in the office.    She did not take amlodipine yesterday or today. Still a little tired but less so.  Still has some BLE edema - she has not had any recent prolonged airplane/car travel, no swelling/pain/redness/warmth to either of her calves.  No difficulty breathing or pain in her chest.    Additionally, she has chronic L hip pain, for which she was being treated by a chiropractor.  She had left hip films, which she states her chiropractor show to an orthopedic friend, who stated it did not look like she needed to have hip replacement.  She does not have those films with her today.  She was told to stop going to the chiropractor related to the findings in her carotid artery.  She is wondering if there is an alternative option for assisting with her hip pain, she would be interested in physical therapy.      History/Other:    Chief Complaint Reviewed and Verified  No Further Nursing Notes to   Review  Tobacco Reviewed  Allergies Reviewed  Medications Reviewed    Problem List Reviewed  Medical History Reviewed  Surgical  History   Reviewed  OB Status Reviewed  Family History Reviewed  Social History   Reviewed         Tobacco:  She has never smoked tobacco.    Current Medications[1]      Review of Systems:  Review of Systems  10 point review of systems otherwise negative with the exception of HPI and assessment and plan.    Objective:   /58   Pulse 63   Temp 96.9 °F (36.1 °C) (Temporal)   Ht 5' 4\" (1.626 m)   Wt 187 lb 6.4 oz (85 kg)   LMP  (LMP Unknown)   SpO2 96%   BMI 32.17 kg/m²  Estimated body mass index is 32.17 kg/m² as calculated from the following:    Height as of this encounter: 5' 4\" (1.626 m).    Weight as of this encounter: 187 lb 6.4 oz (85 kg).      Physical Exam  Vitals reviewed.   Constitutional:       General: She is not in acute distress.  Cardiovascular:      Rate and Rhythm: Normal rate and regular rhythm.      Heart sounds: Normal heart sounds.   Pulmonary:      Effort: Pulmonary effort is normal. No respiratory distress.      Breath sounds: Normal breath sounds.   Musculoskeletal:      Right lower le+ Pitting Edema present.      Left lower le+ Pitting Edema present.      Comments: Walking with a limp/favoring her left leg   Skin:     General: Skin is warm and dry.   Neurological:      Mental Status: She is alert.         Assessment & Plan:   1. Primary hypertension (Primary)        - Advised to continue checking her blood pressure 1-2 times daily.  If her blood pressure is consistently at or above 130/80, AND the edema in her lower legs subsides, she may restart amlodipine at the 5 mg dose she was previously taking.        - She should notify the office if the edema in her lower legs does not resolve within the next week.  Advised to elevate her legs when she is at rest.  2. Chronic left hip pain  -     Physical Therapy Referral - [place] Loc[facility]  - We discussed physical therapy +/- referral to orthopedics.  She would only like a referral to physical therapy at this time, stating  that she would not be interested in hip injections if that were an option presented to her.  She will reach out if physical therapy does not improve her symptoms if/when she is interested in seeing an orthopedic specialist.        Return for scheduled visit with Dr. Gregorio in July.    MARGARITA Salvador, 5/16/2025, 10:04 AM     This note was prepared using Dragon Medical voice recognition dictation software. As a result errors may occur. When identified, these errors have been corrected. While every attempt is made to correct errors during dictation discrepancies may still exist.       [1]   Current Outpatient Medications   Medication Sig Dispense Refill    atorvastatin 20 MG Oral Tab Take 1 tablet (20 mg total) by mouth nightly.      Magnesium 250 MG Oral Cap Take 1 tablet by mouth daily.      Benazepril HCl 10 MG Oral Tab Take 1 tablet (10 mg total) by mouth daily. 90 tablet 3    ascorbic acid 1000 MG Oral Tab Take 1 tablet (1,000 mg total) by mouth daily.      amLODIPine 10 MG Oral Tab Take 1 tablet (10 mg total) by mouth daily. (Patient not taking: Reported on 5/16/2025) 90 tablet 3

## 2025-05-16 NOTE — PATIENT INSTRUCTIONS
Continue to monitor your blood pressure on a daily basis.  If your blood pressure is consistently over 130/80, you can resume taking amlodipine 5 mg.  Please reach out to the office if the swelling in both of your lower legs does not decrease/resolve within the next 1 to 2 weeks.

## 2025-05-20 ENCOUNTER — E-ADVICE (OUTPATIENT)
Dept: BARIATRICS/WEIGHT MGMT | Age: 75
End: 2025-05-20

## 2025-05-22 RX ORDER — TOPIRAMATE 25 MG/1
TABLET, FILM COATED ORAL
Qty: 60 TABLET | Refills: 0 | Status: SHIPPED | OUTPATIENT
Start: 2025-05-22

## 2025-06-06 ENCOUNTER — APPOINTMENT (OUTPATIENT)
Dept: BARIATRICS/WEIGHT MGMT | Age: 75
End: 2025-06-06

## 2025-06-10 ENCOUNTER — E-ADVICE (OUTPATIENT)
Dept: BARIATRICS/WEIGHT MGMT | Age: 75
End: 2025-06-10

## 2025-06-10 ENCOUNTER — APPOINTMENT (OUTPATIENT)
Dept: BARIATRICS/WEIGHT MGMT | Age: 75
End: 2025-06-10

## 2025-06-10 VITALS
TEMPERATURE: 98.2 F | OXYGEN SATURATION: 98 % | BODY MASS INDEX: 34.39 KG/M2 | WEIGHT: 186.9 LBS | DIASTOLIC BLOOD PRESSURE: 70 MMHG | HEART RATE: 59 BPM | HEIGHT: 62 IN | SYSTOLIC BLOOD PRESSURE: 126 MMHG

## 2025-06-10 DIAGNOSIS — Z86.79 HISTORY OF CAROTID ARTERY DISSECTION: ICD-10-CM

## 2025-06-10 DIAGNOSIS — I10 ESSENTIAL (PRIMARY) HYPERTENSION: ICD-10-CM

## 2025-06-10 DIAGNOSIS — E66.812 CLASS 2 OBESITY: Primary | ICD-10-CM

## 2025-06-10 RX ORDER — ATORVASTATIN CALCIUM 20 MG/1
20 TABLET, FILM COATED ORAL NIGHTLY
COMMUNITY
Start: 2025-04-16 | End: 2026-04-16

## 2025-07-07 ENCOUNTER — OFFICE VISIT (OUTPATIENT)
Dept: INTERNAL MEDICINE CLINIC | Facility: CLINIC | Age: 75
End: 2025-07-07

## 2025-07-07 VITALS
OXYGEN SATURATION: 96 % | SYSTOLIC BLOOD PRESSURE: 116 MMHG | HEART RATE: 65 BPM | RESPIRATION RATE: 16 BRPM | BODY MASS INDEX: 33.86 KG/M2 | HEIGHT: 62 IN | WEIGHT: 184 LBS | DIASTOLIC BLOOD PRESSURE: 73 MMHG

## 2025-07-07 DIAGNOSIS — I10 PRIMARY HYPERTENSION: Primary | ICD-10-CM

## 2025-07-07 DIAGNOSIS — R73.03 PREDIABETES: ICD-10-CM

## 2025-07-07 PROBLEM — M31.6 TEMPORAL ARTERITIS (HCC): Status: RESOLVED | Noted: 2025-03-31 | Resolved: 2025-07-07

## 2025-07-07 PROCEDURE — 99213 OFFICE O/P EST LOW 20 MIN: CPT | Performed by: INTERNAL MEDICINE

## 2025-07-07 PROCEDURE — G2211 COMPLEX E/M VISIT ADD ON: HCPCS | Performed by: INTERNAL MEDICINE

## 2025-07-07 RX ORDER — ASPIRIN 325 MG
325 TABLET ORAL DAILY
COMMUNITY

## 2025-07-07 RX ORDER — AMLODIPINE BESYLATE 5 MG/1
5 TABLET ORAL DAILY
COMMUNITY

## 2025-07-07 NOTE — PROGRESS NOTES
Sudha Patrick is a 75 year old female.  Chief Complaint   Patient presents with    Hypertension       HPI:   Patient comes for follow-up  C/C follow-up  C/o didn't need the temporal artery biopsy and her headaches have resolved  Will see the neurologist Dr. Ramirez at [facility] tomorrow      HISTORY  Last mn -3/2025 [facility] -  Carotid artery dissection: vasc surgery on consult, loaded with  and plavix 300--now only on asa   Vertebral artery stenosis: vascular surgery -DR urszula crow apt this  thurs and neuro tomorrow -dr ames   She saw her dentist last week and was told it was not teeth issues that she was having but may be to be evaluated for autoimmune disorders and she will see the neurologist and discussed with him about this did tell patient that they did do this 2 weeks ago-- under labs   Will go for temporary artery biopsy tomorrow            HISTORY  New pt   C/c establish care   C/o seen last her pcp 2/11/2022     Retired in 2017 and moved here then   Used to be seen in [facility] in [place]      Retired nurse -med surg , lives alone      PMH  HTN  Styes   Ashly and venkatesh   L Carotid artery dissection and Vertebral artery stenosis: vascular surgery - dr lotus christie --------------------------------------------------------------------------------------------------       Current Medications[1]   Past Medical History[2]   Past Surgical History[3]   Social History:  Short Social Hx on File[4]     REVIEW OF SYSTEMS:   GENERAL HEALTH: No fevers, chills, sweats, fatigue  VISION: No recent vision problems, blurry vision or double vision  RESPIRATORY: denies shortness of breath, cough, wheezing  CARDIOVASCULAR: denies chest pain on exertion, palpitations, swelling in feet  NEURO: denies headaches , anxiety, depression    EXAM:   /73 (BP Location: Right arm, Patient Position: Sitting, Cuff Size: large)   Pulse 65   Resp 16   Ht 5' 2\" (1.575 m)   Wt  184 lb (83.5 kg)   LMP  (LMP Unknown)   SpO2 96%   BMI 33.65 kg/m²   GENERAL: well developed, well nourished,in no apparent distress  SKIN: no rashes,no suspicious lesions  HEENT: atraumatic, normocephalic  NECK: supple   LUNGS: clear to auscultation, no wheeze  CARDIO: RRR without murmur  EXTREMITIES: no cyanosis, or edema    ASSESSMENT AND PLAN:   Diagnoses and all orders for this visit:    Primary hypertension  Advised pt to follow a low salt , low sodium (including fast foods and processed foods), can look up DASH diet, exercise 30 min a day , monitor bp   Cont meds  Prediabetes  Advised patient to follow low-carb low sugar diet and exercise with a goal of 30 minutes a day, monitor A1c            preventative medicine   Labs -2024 and 2025, 3/2025   Pap-  , defer further   Mammogram - 3/2025  dexa 3/11/2021,   cscope - 3/12/2021 rpt 5 yrs IE     The patient indicates understanding of these issues and agrees to the plan.  No follow-ups on file.       [1]   Current Outpatient Medications   Medication Sig Dispense Refill    amLODIPine 5 MG Oral Tab Take 1 tablet (5 mg total) by mouth daily.      aspirin 325 MG Oral Tab Take 1 tablet (325 mg total) by mouth daily.      ascorbic acid 1000 MG Oral Tab Take 1 tablet (1,000 mg total) by mouth daily.      atorvastatin 20 MG Oral Tab Take 1 tablet (20 mg total) by mouth nightly.      Magnesium 250 MG Oral Cap Take 1 tablet by mouth daily.      Benazepril HCl 10 MG Oral Tab Take 1 tablet (10 mg total) by mouth daily. 90 tablet 3   [2]   Past Medical History:   Allergic rhinitis    Essential hypertension    Obesity   [3]   Past Surgical History:  Procedure Laterality Date    Needle biopsy left      benign          Other surgical history      right hammertoes with screws and left hammer toe and bunnions, right knee torn meniscus    Tubal ligation     [4]   Social History  Socioeconomic History    Marital status: Single   Tobacco Use    Smoking  status: Never     Passive exposure: Never    Smokeless tobacco: Never   Vaping Use    Vaping status: Never Used   Substance and Sexual Activity    Alcohol use: Not Currently    Drug use: Never     Social Drivers of Health     Food Insecurity: No Food Insecurity (7/7/2025)    NCSS - Food Insecurity     Worried About Running Out of Food in the Last Year: No     Ran Out of Food in the Last Year: No   Transportation Needs: No Transportation Needs (7/7/2025)    NCSS - Transportation     Lack of Transportation: No   Housing Stability: Not At Risk (7/7/2025)    NCSS - Housing/Utilities     Has Housing: Yes     Worried About Losing Housing: No     Unable to Get Utilities: No

## 2025-08-12 ENCOUNTER — APPOINTMENT (OUTPATIENT)
Dept: BARIATRICS/WEIGHT MGMT | Age: 75
End: 2025-08-12

## 2025-09-09 ENCOUNTER — APPOINTMENT (OUTPATIENT)
Dept: BARIATRICS/WEIGHT MGMT | Age: 75
End: 2025-09-09